# Patient Record
Sex: FEMALE | Race: WHITE | Employment: FULL TIME | URBAN - METROPOLITAN AREA
[De-identification: names, ages, dates, MRNs, and addresses within clinical notes are randomized per-mention and may not be internally consistent; named-entity substitution may affect disease eponyms.]

---

## 2017-05-15 ENCOUNTER — DOCUMENTATION ONLY (OUTPATIENT)
Dept: SURGERY | Age: 42
End: 2017-05-15

## 2017-05-15 NOTE — PROGRESS NOTES
Left message reminding of appointment/location.  Arrive 15 mins early to fill out paperwork/bring id/insurance card

## 2017-05-16 ENCOUNTER — DOCUMENTATION ONLY (OUTPATIENT)
Dept: SURGERY | Age: 42
End: 2017-05-16

## 2021-02-26 NOTE — PROGRESS NOTES
Neurology Note    Patient ID:  Zachariah Guadalupe  [id number]  59 y.o.  1975      Date of Consultation:  March 1, 2021    Referring Physician: Dr. Jacquie Green    Reason for Consultation:  Neck, arm and shoulder pain    Subjective: I have pain. History of Present Illness:   Zachariah Guadalupe is a 39 y.o. female who presents to the neurology clinic at [facility] for evaluation. The patient states that the current symptoms began approximately 1 year ago. She began to notice pain in her shoulder blade. It ramped up in intensity and she was getting nausea. She went to see physical therapy at [facility] ultimately in October 2020. They were concerned about cervical spine etiology. Her neck pain began to worsen. She felt that there was some weakness developing in her right upper extremity. She was also noticing some fatigue on that side. The pain and fatigue was less so on the left side. She did have an MRI of her cervical spine which showed multilevel degeneration but worse at the C5 and C6 levels. There is no cord root impingement. She was being followed closely by the physical medicine and rehabilitation department. She was offered injections as well as prescription medication such as Lyrica. She was hesitant on both of those and saw a neuromuscular consultation. She did have an EMG/nerve conduction study performed at [facility] and revealed no evidence of an active radiculopathy or focal neuropathy. This was performed on February 15, 2021. She does talk about multiple other symptoms that she will have intermittently. She notices that she would occasionally get neck weakness after intense neck exercises. Afterwards she would just want to rest.  This lasted approximately 2 hours the first time. Then the next day she had a shorter episode that may have lasted up to 30 minutes.   She states that she does have scapular pain and right pain that persist.  Over the past couple weeks however she has felt that the symptoms are better. She does have a prior neurological history. 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 She went revascularization with pipeline as well as carotid stenting on March 26, 2016 with restoration of flow. She did have a subsequent small branch artery retinal occlusion and was placed on aspirin and Plavix. 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 This did improve and she remained on aspirin 325. Due to development of worsening neck pain, she did have a follow-up CTA of the head neck. She is followed closely with interventional neurosurgery and did have an updated ultrasound which was unremarkable. 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 On vyvanse.     Past Medical History:   Diagnosis Date    Asthma     Cancer ([facility] [address]     thyroid papillary    Depression     HTN (hypertension)     Inflammatory arthritis       Shingles in the past.       Past Surgical History:   Procedure Laterality Date    HX OTHER SURGICAL  2016    carotid artery dissection    HX THYROIDECTOMY  1999    total    HX TONSIL AND ADENOIDECTOMY  1984        Family History   Problem Relation Age of Onset    Stroke Father     Diabetes Father     Hypertension Father     Hypertension Sister     Breast Cancer Sister     Heart Attack Sister         Social History     Tobacco Use    Smoking status: Never Smoker    Smokeless tobacco: Never Used   Substance Use Topics    Alcohol use: No        Allergies   Allergen Reactions    Albuterol Other (comments)    Bactrim [Sulfamethoprim Ds] [place] Other (comments)     Dust mites    Lamictal [Lamotrigine] Itching     Can be removed    Lisinopril Cough        Prior to Admission medications    Medication Sig Start Date End Date Taking? Authorizing Provider   calcium-magnesium-vit D3-boron 400 mg-133 mg- 6.67 mcg-1 mg cap g mL each dose, PO, bid, 0 Refills 2/17/21  Yes Provider, Historical   cholecalciferol (D3-2000) (2,000 UNITS /50 MCG) cap capsule Take  by mouth. Yes Provider, Historical   ciclopirox (LOPROX) 0.77 % topical cream Apply  to affected area two (2) times a day. Yes Provider, Historical   lisdexamfetamine (Vyvanse) 50 mg cap Take  by mouth daily. Yes Provider, Historical   lamoTRIgine (LaMICtaL) 150 mg tablet Take  by mouth daily. Yes Provider, Historical   sertraline (ZOLOFT) 100 mg tablet Take 100 mg by mouth two (2) times a day. Yes Provider, Historical   buPROPion XL (WELLBUTRIN XL) 300 mg XL tablet Take 300 mg by mouth every morning. Yes Provider, Historical   adalimumab (HUMRIA) 40 mg/0.8 mL injection Inject into the skin every 14 days. Yes Provider, Historical   levothyroxine (SYNTHROID) 175 mcg tablet Take 175 mcg by mouth daily. Take 30 minutes to 1 hour before breakfast   Yes Provider, Historical   fexofenadine (ALLEGRA) 180 mg tablet Take 60 mg by mouth daily. Yes Provider, Historical   cholecalciferol, vitamin d3, (VITAMIN D) 1,000 unit tablet Take 5,000 Units by mouth as directed. 3 times a week  10/27/10  Yes Provider, Historical   ferrous fum/docusate/folic ac (FERROUS FUM-FOLIC ACID-DSS PO) Take  by mouth. Provider, Historical   levalbuterol (XOPENEX) 0.31 mg/3 mL nebu Take  by inhalation. Provider, Historical   LATUDA 40 mg tab tablet  4/14/16   Provider, Historical   aspirin delayed-release 325 mg tablet  3/26/16   Provider, Historical   clopidogrel (PLAVIX) 75 mg tablet  4/22/16   Provider, Historical   methotrexate, PF, 15 mg/0.3 mL atIn Inject into the skin. Provider, Historical   warfarin (COUMADIN) 7.5 mg tablet Take 7.5 mg by mouth daily.  Indications: 4 days a week    Provider, Historical   warfarin (COUMADIN) 10 mg tablet Take 10 mg by mouth daily. Indications: 3 days a week    Provider, Historical   pantoprazole (PROTONIX) 20 mg tablet Take 20 mg by mouth daily. Provider, Historical   ferrous gluconate 325 mg (37 mg iron) tab Take 1 Tab by mouth daily. Provider, Historical   levalbuterol (XOPENEX) 0.63 mg/3 mL nebulizer solution 0.63 mg by Nebulization route once. Provider, Historical   FLUoxetine (PROZAC) 10 mg capsule Take 40 mg by mouth daily. Provider, Historical   irbesartan (AVAPRO) 150 mg tablet Take 150 mg by mouth nightly. Provider, Historical   leucovorin calcium (WELLCOVORIN) 5 mg tablet Take  by mouth. Indications: 3 days per week    Provider, Historical       Review of Systems:    General, constitutional: tight neck muscles. Eyes, vision: intermittent blurry in her vision, rarely. In the left eye. Ears, nose, throat: negative  Cardiovascular, heart: negative  Respiratory: negative  Gastrointestinal: negative  Genitourinary: negative  Musculoskeletal: joint pain. Right toe numbness. Skin and integumentary: negative  Psychiatric: negative  Endocrine: negative  Neurological: negative, except for HPI  Hematologic/lymphatic: negative  Allergy/immunology: negative    Objective:     Visit Vitals  /64 (BP 1 Location: Right arm, BP Patient Position: Sitting, BP Cuff Size: Adult)   Pulse (!) 108   Resp 17   Wt 192 lb (87.1 kg)   SpO2 96%   BMI 30.07 kg/m²       Physical Exam:      General:  appears well nourished in no acute distress  Neck: no carotid bruits  Lungs: clear to auscultation  Heart:  no murmurs, regular rate  Lower extremity: peripheral pulses palpable and no edema  Skin: intact    Neurological exam:    Awake, alert, oriented to person, place and time  Recent and remote memory were normal  Attention and concentration were intact  Language was intact.   There was no aphasia  Speech: no dysarthria  Fund of knowledge was preserved    Cranial nerves:   II-XII were tested    Mild aniscoria  Fundoscopic examination revealed venous pulsations and no clear abnormalities  Visual fields were full  Eomi, no evidence of nystagmus  Facial sensation:  normal and symmetric  Facial motor: normal and symmetric  Hearing intact  SCM strength intact  Tongue: midline without fasciculations    Motor: Tone normal  Pronator drift was absent. No evidence of fasciculations    Strength testing:   deltoid triceps biceps Wrist ext. Wrist flex. intrinsics Hip flex. Hip ext. Knee ext. Knee flex Dorsi flex Plantar flex   Right 5 5 5 5 5 5 5 5 5 5 5 5   Left 5 5 5 5 5 5 5 5 5 5 5 5     No wingled scapula. Rhomboid, infra, serratus strength was all 5/5    Sensory:  Upper extremity: intact to pp, light touch, and vibration > 10 seconds  Lower extremity: intact to pp, light touch, and vibration > 10 seconds    Reflexes:    Right Left  Biceps  2 2  Triceps 2 2  Brachiorad. 2 2  Patella  2 2  Achilles 2 2    Plantar response:  flexor bilaterally      Cerebellar testing:  no tremor apparent, finger/nose and kamryn were intact    Romberg: absent    Gait: steady. She was able to tandem gait without difficulty    Labs:     No results found for: HBA1C, NA, K, CL, GLU, BUN, CREA, CA, WBC, HCT, HGB, PLT, LDL, [id number], HCTEXT, HGBEXT, PLTEXT, [id number], HCTEXT, HGBEXT, PLTEXT    Imaging:    No results found for this or any previous visit. No results found for this or any previous visit. Medical records from [facility] as noted above. Assessment and Plan:    The patient is a pleasant 53-year-old female who presents with intermittent episodes of neck pain and scapular pain. She does have a history of degenerative spine disease in her cervical spine as well as inflammatory arthritis. Her neurological and neuromuscular examination revealed no evidence of focal weakness, sensory deficit or abnormal reflexes.     Neck pain and musculoskeletal scapular pain:  This is most likely multifactorial in etiology including her history of cervical degenerative spine disease, inflammatory arthritis, and musculoskeletal related tendon inflammation. I did reassure her that with a normal neuromuscular examination as well as a normal EMG/nerve conduction study, that I do not think any additional testing is necessary for her current symptoms. I did talk to her about using over-the-counter anti-inflammatory ointment, Voltaren, to the affected areas. I did stress to her the importance of stretching and exercise. Consider deep tissue massage. I did recommend given her history of a carotid dissection as well cervical spine disease that she should not consider chiropractic evaluation. this is something she asked about. I did tell the patient that if symptoms worsen or she notices weakness that is persistent, that she should call the office and I will get her in for a urgent follow-up visit. Carotid artery dissection:  Continue close follow up with interventional neurosurgery    Narcolepsy:   Continue current treatment    Depression:  Continue current treatment           Patient Active Problem List   Diagnosis Code    Heart palpitations R00.2    Essential hypertension, benign I10    Major depressive disorder F32.9    Anxiety F41.9    Allergic rhinitis J30.9    Sleep apnea G47.30    Disorder of carotid artery (HCC) I77.9    Retinal artery occlusion H34.9        The patient should return to clinic as needed    Renewed medication: none     I spent  70  minutes on the day of the encounter preparing the office visit by reviewing medical records, obtaining a history, performing examination, counseling and educating the patient and family members on diagnosis, ordering medications and tests, documenting in the clinical medical record, and coordinating the care for the patient. The patient had the ability to ask questions and all questions were answered.                Signed By:  Jennifer Schumacher DO FAAN    March 1, 2021

## 2021-03-01 ENCOUNTER — OFFICE VISIT (OUTPATIENT)
Dept: NEUROLOGY | Age: 46
End: 2021-03-01
Payer: COMMERCIAL

## 2021-03-01 VITALS
HEART RATE: 108 BPM | DIASTOLIC BLOOD PRESSURE: 64 MMHG | OXYGEN SATURATION: 96 % | SYSTOLIC BLOOD PRESSURE: 110 MMHG | BODY MASS INDEX: 30.07 KG/M2 | RESPIRATION RATE: 17 BRPM | WEIGHT: 192 LBS

## 2021-03-01 DIAGNOSIS — M62.81 MUSCLE LEFT ARM WEAKNESS: ICD-10-CM

## 2021-03-01 DIAGNOSIS — M54.2 NECK PAIN: Primary | ICD-10-CM

## 2021-03-01 PROBLEM — I77.9 DISORDER OF CAROTID ARTERY (HCC): Status: ACTIVE | Noted: 2021-03-01

## 2021-03-01 PROBLEM — G47.30 SLEEP APNEA: Status: ACTIVE | Noted: 2021-03-01

## 2021-03-01 PROBLEM — J30.9 ALLERGIC RHINITIS: Status: ACTIVE | Noted: 2021-03-01

## 2021-03-01 PROBLEM — F32.9 MAJOR DEPRESSIVE DISORDER: Status: ACTIVE | Noted: 2021-03-01

## 2021-03-01 PROBLEM — F41.9 ANXIETY: Status: ACTIVE | Noted: 2021-03-01

## 2021-03-01 PROCEDURE — 99205 OFFICE O/P NEW HI 60 MIN: CPT | Performed by: PSYCHIATRY & NEUROLOGY

## 2021-03-01 RX ORDER — BUPROPION HYDROCHLORIDE 300 MG/1
300 TABLET ORAL
COMMUNITY

## 2021-03-01 RX ORDER — SERTRALINE HYDROCHLORIDE 100 MG/1
200 TABLET, FILM COATED ORAL
COMMUNITY

## 2021-03-01 RX ORDER — LAMOTRIGINE 100 MG/1
200 TABLET ORAL
COMMUNITY

## 2021-03-01 RX ORDER — LEVALBUTEROL INHALATION SOLUTION 0.31 MG/3ML
SOLUTION RESPIRATORY (INHALATION)
COMMUNITY
End: 2022-08-28

## 2021-03-01 RX ORDER — CALCIUM/MAGNESIUM
TABLET ORAL
COMMUNITY
Start: 2021-02-17 | End: 2022-08-28

## 2021-03-01 RX ORDER — ACETAMINOPHEN 500 MG
TABLET ORAL
COMMUNITY
End: 2022-08-28

## 2021-03-01 RX ORDER — CICLOPIROX OLAMINE 7.7 MG/G
CREAM TOPICAL 2 TIMES DAILY
COMMUNITY
End: 2022-08-28

## 2022-03-18 PROBLEM — J30.9 ALLERGIC RHINITIS: Status: ACTIVE | Noted: 2021-03-01

## 2022-03-18 PROBLEM — G47.30 SLEEP APNEA: Status: ACTIVE | Noted: 2021-03-01

## 2022-03-19 PROBLEM — I77.9 DISORDER OF CAROTID ARTERY (HCC): Status: ACTIVE | Noted: 2021-03-01

## 2022-03-19 PROBLEM — F41.9 ANXIETY: Status: ACTIVE | Noted: 2021-03-01

## 2022-03-19 PROBLEM — F32.9 MAJOR DEPRESSIVE DISORDER: Status: ACTIVE | Noted: 2021-03-01

## 2022-08-26 ENCOUNTER — TELEPHONE (OUTPATIENT)
Dept: NEUROLOGY | Age: 47
End: 2022-08-26

## 2022-08-26 NOTE — TELEPHONE ENCOUNTER
Patient called wanting to discuss with Dr or Nurse her needing to be seen for transient neurological episodes. Please advise.    [phone]

## 2022-08-28 ENCOUNTER — APPOINTMENT (OUTPATIENT)
Dept: MRI IMAGING | Age: 47
DRG: 072 | End: 2022-08-28
Attending: HOSPITALIST
Payer: COMMERCIAL

## 2022-08-28 ENCOUNTER — HOSPITAL ENCOUNTER (INPATIENT)
Age: 47
LOS: 5 days | Discharge: HOME | DRG: 072 | End: 2022-09-02
Attending: EMERGENCY MEDICINE | Admitting: INTERNAL MEDICINE
Payer: COMMERCIAL

## 2022-08-28 DIAGNOSIS — R51.9 FREQUENT HEADACHES: ICD-10-CM

## 2022-08-28 DIAGNOSIS — R40.4 TRANSIENT ALTERATION OF AWARENESS: ICD-10-CM

## 2022-08-28 DIAGNOSIS — R41.0 CONFUSION: Primary | ICD-10-CM

## 2022-08-28 DIAGNOSIS — F33.9 EPISODE OF RECURRENT MAJOR DEPRESSIVE DISORDER, UNSPECIFIED DEPRESSION EPISODE SEVERITY (HCC): ICD-10-CM

## 2022-08-28 DIAGNOSIS — R47.9 DIFFICULTY WITH SPEECH: ICD-10-CM

## 2022-08-28 LAB
ALBUMIN SERPL-MCNC: 3.7 G/DL (ref 3.5–5)
ALBUMIN/GLOB SERPL: 1.1 {RATIO} (ref 1.1–2.2)
ALP SERPL-CCNC: 73 U/L (ref 45–117)
ALT SERPL-CCNC: 29 U/L (ref 12–78)
ANION GAP SERPL CALC-SCNC: 1 MMOL/L (ref 5–15)
APPEARANCE UR: CLEAR
AST SERPL-CCNC: 18 U/L (ref 15–37)
BASOPHILS # BLD: 0.1 K/UL (ref 0–0.1)
BASOPHILS NFR BLD: 1 % (ref 0–1)
BILIRUB SERPL-MCNC: 0.3 MG/DL (ref 0.2–1)
BILIRUB UR QL: NEGATIVE
BUN SERPL-MCNC: 11 MG/DL (ref 6–20)
BUN/CREAT SERPL: 16 (ref 12–20)
CALCIUM SERPL-MCNC: 9.1 MG/DL (ref 8.5–10.1)
CHLORIDE SERPL-SCNC: 106 MMOL/L (ref 97–108)
CO2 SERPL-SCNC: 30 MMOL/L (ref 21–32)
COLOR UR: NORMAL
COMMENT, HOLDF: NORMAL
CREAT SERPL-MCNC: 0.7 MG/DL (ref 0.55–1.02)
DIFFERENTIAL METHOD BLD: ABNORMAL
EOSINOPHIL # BLD: 0.1 K/UL (ref 0–0.4)
EOSINOPHIL NFR BLD: 1 % (ref 0–7)
ERYTHROCYTE [DISTWIDTH] IN BLOOD BY AUTOMATED COUNT: 11.9 % (ref 11.5–14.5)
ERYTHROCYTE [SEDIMENTATION RATE] IN BLOOD: 6 MM/HR (ref 0–20)
GLOBULIN SER CALC-MCNC: 3.4 G/DL (ref 2–4)
GLUCOSE SERPL-MCNC: 89 MG/DL (ref 65–100)
GLUCOSE UR STRIP.AUTO-MCNC: NEGATIVE MG/DL
HCT VFR BLD AUTO: 41.5 % (ref 35–47)
HGB BLD-MCNC: 14.5 G/DL (ref 11.5–16)
HGB UR QL STRIP: NEGATIVE
IMM GRANULOCYTES # BLD AUTO: 0.1 K/UL (ref 0–0.04)
IMM GRANULOCYTES NFR BLD AUTO: 1 % (ref 0–0.5)
KETONES UR QL STRIP.AUTO: NEGATIVE MG/DL
LEUKOCYTE ESTERASE UR QL STRIP.AUTO: NEGATIVE
LYMPHOCYTES # BLD: 1.3 K/UL (ref 0.8–3.5)
LYMPHOCYTES NFR BLD: 11 % (ref 12–49)
MCH RBC QN AUTO: 34.1 PG (ref 26–34)
MCHC RBC AUTO-ENTMCNC: 34.9 G/DL (ref 30–36.5)
MCV RBC AUTO: 97.6 FL (ref 80–99)
MONOCYTES # BLD: 0.9 K/UL (ref 0–1)
MONOCYTES NFR BLD: 8 % (ref 5–13)
NEUTS SEG # BLD: 9.3 K/UL (ref 1.8–8)
NEUTS SEG NFR BLD: 78 % (ref 32–75)
NITRITE UR QL STRIP.AUTO: NEGATIVE
NRBC # BLD: 0 K/UL (ref 0–0.01)
NRBC BLD-RTO: 0 PER 100 WBC
PH UR STRIP: 6.5 [PH] (ref 5–8)
PLATELET # BLD AUTO: 224 K/UL (ref 150–400)
PMV BLD AUTO: 9.6 FL (ref 8.9–12.9)
POTASSIUM SERPL-SCNC: 4.1 MMOL/L (ref 3.5–5.1)
PROT SERPL-MCNC: 7.1 G/DL (ref 6.4–8.2)
PROT UR STRIP-MCNC: NEGATIVE MG/DL
RBC # BLD AUTO: 4.25 M/UL (ref 3.8–5.2)
SAMPLES BEING HELD,HOLD: NORMAL
SODIUM SERPL-SCNC: 137 MMOL/L (ref 136–145)
SP GR UR REFRACTOMETRY: 1.01 (ref 1–1.03)
UR CULT HOLD, URHOLD: NORMAL
UROBILINOGEN UR QL STRIP.AUTO: 0.2 EU/DL (ref 0.2–1)
WBC # BLD AUTO: 11.7 K/UL (ref 3.6–11)

## 2022-08-28 PROCEDURE — 74011250636 HC RX REV CODE- 250/636: Performed by: HOSPITALIST

## 2022-08-28 PROCEDURE — G0378 HOSPITAL OBSERVATION PER HR: HCPCS

## 2022-08-28 PROCEDURE — 85652 RBC SED RATE AUTOMATED: CPT

## 2022-08-28 PROCEDURE — 70544 MR ANGIOGRAPHY HEAD W/O DYE: CPT

## 2022-08-28 PROCEDURE — 85025 COMPLETE CBC W/AUTO DIFF WBC: CPT

## 2022-08-28 PROCEDURE — 81003 URINALYSIS AUTO W/O SCOPE: CPT

## 2022-08-28 PROCEDURE — 80053 COMPREHEN METABOLIC PANEL: CPT

## 2022-08-28 PROCEDURE — 99285 EMERGENCY DEPT VISIT HI MDM: CPT

## 2022-08-28 PROCEDURE — 95816 EEG AWAKE AND DROWSY: CPT | Performed by: HOSPITALIST

## 2022-08-28 PROCEDURE — 36415 COLL VENOUS BLD VENIPUNCTURE: CPT

## 2022-08-28 PROCEDURE — 70551 MRI BRAIN STEM W/O DYE: CPT

## 2022-08-28 PROCEDURE — 65270000029 HC RM PRIVATE

## 2022-08-28 PROCEDURE — 74011250637 HC RX REV CODE- 250/637: Performed by: HOSPITALIST

## 2022-08-28 PROCEDURE — 70547 MR ANGIOGRAPHY NECK W/O DYE: CPT

## 2022-08-28 RX ORDER — LAMOTRIGINE 100 MG/1
200 TABLET ORAL
Status: DISCONTINUED | OUTPATIENT
Start: 2022-08-28 | End: 2022-09-03 | Stop reason: HOSPADM

## 2022-08-28 RX ORDER — LEFLUNOMIDE 20 MG/1
20 TABLET ORAL DAILY
COMMUNITY

## 2022-08-28 RX ORDER — LAMOTRIGINE 25 MG/1
25 TABLET ORAL DAILY
Status: DISCONTINUED | OUTPATIENT
Start: 2022-08-29 | End: 2022-09-03 | Stop reason: HOSPADM

## 2022-08-28 RX ORDER — MONTELUKAST SODIUM 10 MG/1
10 TABLET ORAL
Status: DISCONTINUED | OUTPATIENT
Start: 2022-08-28 | End: 2022-09-03 | Stop reason: HOSPADM

## 2022-08-28 RX ORDER — LEVOTHYROXINE SODIUM 175 UG/1
350 TABLET ORAL
COMMUNITY

## 2022-08-28 RX ORDER — PROMETHAZINE HYDROCHLORIDE 12.5 MG/1
12.5 TABLET ORAL
COMMUNITY

## 2022-08-28 RX ORDER — CHOLECALCIFEROL TAB 125 MCG (5000 UNIT) 125 MCG
5000 TAB ORAL
COMMUNITY

## 2022-08-28 RX ORDER — PRAVASTATIN SODIUM 10 MG/1
10 TABLET ORAL
COMMUNITY

## 2022-08-28 RX ORDER — LEVALBUTEROL TARTRATE 45 UG/1
2 AEROSOL, METERED ORAL
COMMUNITY

## 2022-08-28 RX ORDER — MONTELUKAST SODIUM 10 MG/1
10 TABLET ORAL
COMMUNITY

## 2022-08-28 RX ORDER — ACETAMINOPHEN 650 MG/1
650 SUPPOSITORY RECTAL
Status: DISCONTINUED | OUTPATIENT
Start: 2022-08-28 | End: 2022-09-03 | Stop reason: HOSPADM

## 2022-08-28 RX ORDER — LEVOTHYROXINE SODIUM 175 UG/1
262.5 TABLET ORAL
COMMUNITY

## 2022-08-28 RX ORDER — BUPROPION HYDROCHLORIDE 100 MG/1
200 TABLET, EXTENDED RELEASE ORAL
Status: DISCONTINUED | OUTPATIENT
Start: 2022-08-28 | End: 2022-08-28

## 2022-08-28 RX ORDER — (CALCIUM, MAGNESIUM, POTASSIUM, AND SODIUM OXYBATES) .5; .5; .5; .5 G/ML; G/ML; G/ML; G/ML
2750 SOLUTION ORAL
COMMUNITY

## 2022-08-28 RX ORDER — PREDNISONE 5 MG/1
10 TABLET ORAL DAILY
COMMUNITY

## 2022-08-28 RX ORDER — PRAVASTATIN SODIUM 10 MG/1
10 TABLET ORAL
Status: DISCONTINUED | OUTPATIENT
Start: 2022-08-28 | End: 2022-09-03 | Stop reason: HOSPADM

## 2022-08-28 RX ORDER — LAMOTRIGINE 25 MG/1
25 TABLET ORAL DAILY
COMMUNITY

## 2022-08-28 RX ORDER — ATENOLOL 25 MG/1
25 TABLET ORAL EVERY 12 HOURS
Status: DISCONTINUED | OUTPATIENT
Start: 2022-08-28 | End: 2022-09-03 | Stop reason: HOSPADM

## 2022-08-28 RX ORDER — ENOXAPARIN SODIUM 100 MG/ML
40 INJECTION SUBCUTANEOUS EVERY 24 HOURS
Status: DISCONTINUED | OUTPATIENT
Start: 2022-08-28 | End: 2022-08-30

## 2022-08-28 RX ORDER — ACETAMINOPHEN 500 MG
1000 TABLET ORAL
COMMUNITY

## 2022-08-28 RX ORDER — PREDNISONE 10 MG/1
10 TABLET ORAL DAILY
Status: DISCONTINUED | OUTPATIENT
Start: 2022-08-29 | End: 2022-09-03 | Stop reason: HOSPADM

## 2022-08-28 RX ORDER — BACLOFEN 10 MG/1
10 TABLET ORAL EVERY 12 HOURS
COMMUNITY

## 2022-08-28 RX ORDER — SERTRALINE HYDROCHLORIDE 50 MG/1
200 TABLET, FILM COATED ORAL
Status: DISCONTINUED | OUTPATIENT
Start: 2022-08-28 | End: 2022-09-03 | Stop reason: HOSPADM

## 2022-08-28 RX ORDER — MINERAL OIL
ENEMA (ML) RECTAL DAILY
Status: DISCONTINUED | OUTPATIENT
Start: 2022-08-30 | End: 2022-09-03 | Stop reason: HOSPADM

## 2022-08-28 RX ORDER — LEVOTHYROXINE SODIUM 175 UG/1
175 TABLET ORAL
COMMUNITY

## 2022-08-28 RX ORDER — ACETAMINOPHEN 325 MG/1
650 TABLET ORAL
Status: DISCONTINUED | OUTPATIENT
Start: 2022-08-28 | End: 2022-09-03 | Stop reason: HOSPADM

## 2022-08-28 RX ORDER — LEFLUNOMIDE 10 MG/1
20 TABLET ORAL DAILY
Status: DISCONTINUED | OUTPATIENT
Start: 2022-08-29 | End: 2022-09-03 | Stop reason: HOSPADM

## 2022-08-28 RX ORDER — GUAIFENESIN 100 MG/5ML
81 LIQUID (ML) ORAL DAILY
Status: DISCONTINUED | OUTPATIENT
Start: 2022-08-29 | End: 2022-09-03 | Stop reason: HOSPADM

## 2022-08-28 RX ORDER — ATENOLOL 25 MG/1
25 TABLET ORAL EVERY 12 HOURS
COMMUNITY

## 2022-08-28 RX ORDER — SEMAGLUTIDE 1.34 MG/ML
0.25 INJECTION, SOLUTION SUBCUTANEOUS EVERY OTHER DAY
COMMUNITY

## 2022-08-28 RX ORDER — THERA TABS 400 MCG
1 TAB ORAL DAILY
COMMUNITY

## 2022-08-28 RX ADMIN — LAMOTRIGINE 200 MG: 100 TABLET ORAL at 21:11

## 2022-08-28 RX ADMIN — ENOXAPARIN SODIUM 40 MG: 100 INJECTION SUBCUTANEOUS at 21:15

## 2022-08-28 RX ADMIN — SERTRALINE 200 MG: 50 TABLET, FILM COATED ORAL at 21:14

## 2022-08-28 RX ADMIN — PRAVASTATIN SODIUM 10 MG: 10 TABLET ORAL at 21:11

## 2022-08-28 RX ADMIN — MONTELUKAST 10 MG: 10 TABLET, FILM COATED ORAL at 21:11

## 2022-08-28 RX ADMIN — ATENOLOL 25 MG: 25 TABLET ORAL at 21:12

## 2022-08-28 NOTE — PROGRESS NOTES
Pharmacist Admission Medication Reconciliation:    Information obtained from: This medication history was obtained from the patient. She brought her medication bottles to the hospital and was able to answer clarification questions. RxQuery data available¹:  YES    Allergies:  Albuterol, Bactrim [sulfamethoprim ds], Lisinopril, Olmesartan, and Spironolactone    Significant PMH/Disease States:   Past Medical History:   Diagnosis Date    Asthma     Cancer ([facility] [address]     thyroid papillary    Depression     HTN (hypertension)     Inflammatory arthritis      Chief Complaint for this Admission:    Chief Complaint   Patient presents with    Altered mental status     Prior to Admission Medications:   Prior to Admission Medications   Prescriptions Last Dose Informant Patient Reported? Taking? CAFFEINE PO   Yes Yes   Sig: Take 1,000 mg by mouth four (4) times daily as needed. ([employer])   Lisdexamfetamine (VYVANSE) 70 mg cap 2022  Yes Yes   Sig: Take 70 mg by mouth daily. acetaminophen (TYLENOL) 500 mg tablet   Yes Yes   Sig: Take 1,000 mg by mouth four (4) times daily as needed for Pain (headache). adalimumab (HUMRIA) 40 mg/0.8 mL injection   Yes Yes   Si mg by SubCUTAneous route every Tuesday. aspirin delayed-release 81 mg tablet 2022  Yes Yes   Sig: Take 81 mg by mouth daily. Indications: carotid artery disection with stent   atenoloL (TENORMIN) 25 mg tablet 2022 at am  Yes Yes   Sig: Take 25 mg by mouth every twelve (12) hours. baclofen (LIORESAL) 10 mg tablet 2022 at am  Yes Yes   Sig: Take 10 mg by mouth every twelve (12) hours. buPROPion XL (WELLBUTRIN XL) 300 mg XL tablet 2022  Yes Yes   Sig: Take 300 mg by mouth nightly. cholecalciferol (VITAMIN D3) (5000 Units/125 mcg) tab tablet   Yes Yes   Sig: Take 5,000 Units by mouth five (5) days a week. fexofenadine (ALLEGRA) 180 mg tablet 2022 at am  Yes Yes   Sig: Take 180 mg by mouth every twelve (12) hours.    lamoTRIgine (LaMICtal) 100 mg tablet 2022  Yes Yes   Sig: Take 200 mg by mouth nightly. (25 mg in the morning; 200 mg in the evening)   lamoTRIgine (LaMICtal) 25 mg tablet 2022  Yes Yes   Sig: Take 25 mg by mouth daily. (25 mg in the morning; 200 mg in the evening)   leflunomide (ARAVA) 20 mg tablet 2022  Yes Yes   Sig: Take 20 mg by mouth daily. levalbuterol tartrate (XOPENEX) 45 mcg/actuation inhaler   Yes Yes   Sig: Take 2 Puffs by inhalation every four (4) hours as needed for Wheezing or Shortness of Breath. levothyroxine (SYNTHROID) 175 mcg tablet 2022  Yes Yes   Sig: Take 175 mcg by mouth five (5) days a week. (175 mcg x 5 days; 350 mcg on ; 262.5 mcg on Wednesday)   levothyroxine (SYNTHROID) 175 mcg tablet 2022  Yes Yes   Sig: Take 350 mcg by mouth every . (175 mcg x 5 days; 350 mcg on ; 262.5 mcg on Wednesday)   levothyroxine (SYNTHROID) 175 mcg tablet 2022  Yes Yes   Sig: Take 262.5 mcg by mouth every Wednesday. (175 mcg x 5 days; 350 mcg on ; 262.5 mcg on Wednesday)   montelukast (SINGULAIR) 10 mg tablet 2022  Yes Yes   Sig: Take 10 mg by mouth nightly. pravastatin (PRAVACHOL) 10 mg tablet 2022  Yes Yes   Sig: Take 10 mg by mouth nightly. predniSONE (DELTASONE) 5 mg tablet   Yes Yes   Sig: Take 10 mg by mouth daily. promethazine (PHENERGAN) 12.5 mg tablet   Yes Yes   Sig: Take 12.5 mg by mouth every six (6) hours as needed for Nausea. semaglutide (Ozempic) 1 mg/dose (2 mg/1.5 mL) sub-q pen   Yes Yes   Si.25 mg by SubCUTAneous route every other day. sertraline (ZOLOFT) 100 mg tablet 2022  Yes Yes   Sig: Take 200 mg by mouth nightly.   sodium,calcium,mag,pot oxybate (Xywav) 0.5 gram/mL soln   Yes Yes   Sig: Take 2,750 mg by mouth nightly. (dose = 2750 mg = 5.5 mL)   therapeutic multivitamin (Thera) tablet   Yes Yes   Sig: Take 1 Tablet by mouth daily.       Facility-Administered Medications: None     Thank you for allowing me to participate in this patient's care. Please call  or  with any questions. Tamera Puckett., BCPS, BCPPS

## 2022-08-28 NOTE — ED TRIAGE NOTES
Pt arrives for intermittent confusion, periods of inability to perform known tasks and not recognizing known places. These symptoms have been persistent x 2 weeks, she was seen at [facility] ED on Friday, had neuro workup and eval to include CTA head and neck and neurologist ehsan and jair'd with plan to follow up this week with neuro but now has concern for seizure activity due to presenting symptoms. Pt appears very anxious and has scattered thought processes with difficulty explaining symptoms. A&OX4, GCS 15. Denies any recent trauma or injuries.

## 2022-08-28 NOTE — ED PROVIDER NOTES
Date of Service:  8/28/2022    Patient:  Adarsh Landrum    Chief Complaint:  Altered mental status       HPI:  Adarsh Landrum is a 42 y.o.  female who presents for evaluation of confusion, forgetfulness, trouble with her speech clumsiness multiple other complaints. Patient states that since Friday she has been confused. She notes headaches that they believe her complex migraines that could be a cause of most of her symptoms. Extensive history included dissection was on blood thinners with work-up with providers recently she had been seen at [facility] ED at the request of one of her providers and had a unremarkable CTA head and neck, unremarkable work-up. She states that she was seen there by neurology resident but never was seen by the neurology attending. 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 She notes auditory illusions, not hallucinations at home. Generalized clumsiness. No change in her vision or hearing. She is able to ambulate okay. She denies numbness tingling trauma or other acute complaints. Patient is very worried and is concerned that she has not seen neurology yet although she has been referred to neuropsychology. She also was told that she needs a EEG and is requesting 1 here but she is believes that she is having temporal seizures that could be causing all this.        Past Medical History:   Diagnosis Date    Asthma     Cancer ([facility] [address]     thyroid papillary    Depression     HTN (hypertension)     Inflammatory arthritis        Past Surgical History:   Procedure Laterality Date    HX OTHER SURGICAL  2016    carotid artery dissection    HX THYROIDECTOMY  1999    total    HX TONSIL AND ADENOIDECTOMY  1984         Family History: Problem Relation Age of Onset    Stroke Father     Diabetes Father     Hypertension Father     Hypertension Sister     Breast Cancer Sister     Heart Attack Sister        Social History     Socioeconomic History    Marital status:      Spouse name: Not on file    Number of children: Not on file    Years of education: Not on file    Highest education level: Not on file   Occupational History    Not on file   Tobacco Use    Smoking status: Never    Smokeless tobacco: Never   Substance and Sexual Activity    Alcohol use: No    Drug use: Not on file    Sexual activity: Not on file   Other Topics Concern    Not on file   Social History Narrative    Not on file     Social Determinants of Health     Financial Resource Strain: Not on file   Food Insecurity: Not on file   Transportation Needs: Not on file   Physical Activity: Not on file   Stress: Not on file   Social Connections: Not on file   Intimate Partner Violence: Not on file   Housing Stability: Not on file         ALLERGIES: Albuterol, Bactrim [sulfamethoprim ds], House dust, Lamictal [lamotrigine], and Lisinopril    Review of Systems   All other systems reviewed and are negative. Vitals:    08/28/22 1514 08/28/22 1636   BP: (!) 161/93 (!) 146/83   Pulse: 84 77   Resp: 18 14   Temp: 98 °F (36.7 °C) 98.3 °F (36.8 °C)   SpO2: 98% 96%   Weight:  85.7 kg (189 lb)   Height:  5' 7\" (1.702 m)            Physical Exam  Vitals and nursing note reviewed. Constitutional:       Appearance: She is well-developed. HENT:      Head: Normocephalic and atraumatic. Nose: Nose normal.      Mouth/Throat:      Mouth: Mucous membranes are moist.   Eyes:      General: No scleral icterus. Cardiovascular:      Rate and Rhythm: Normal rate. Pulmonary:      Effort: Pulmonary effort is normal.   Abdominal:      General: Abdomen is flat. Musculoskeletal:         General: No deformity. Skin:     General: Skin is warm.       Capillary Refill: Capillary refill takes less than 2 seconds. Neurological:      Mental Status: She is alert and oriented to person, place, and time. Gait: Gait normal.   Psychiatric:         Mood and Affect: Mood normal.        MDM     VITAL SIGNS:  Patient Vitals for the past 4 hrs:   Temp Pulse Resp BP SpO2   08/28/22 1710 -- -- -- -- 97 %   08/28/22 1636 98.3 °F (36.8 °C) 77 14 (!) 146/83 96 %   08/28/22 1514 98 °F (36.7 °C) 84 18 (!) 161/93 98 %         LABS:  No results found for this or any previous visit (from the past 6 hour(s)). IMAGING:  No orders to display         Medications During Visit:  Medications - No data to display      DECISION MAKING:  Chary Salgado is a 42 y.o. female who comes in as above. Here, patient has no obvious focal neurodeficit. She has a description of headaches with some focal shaking confusion forgetfulness and myriad of other complaints. Patient has been seen by teleneurology who agrees that this could be focal seizures. Given the continuing symptoms, will bring the patient in for MRI/MRA and neurological evaluation with probable EEG. Please refer to their note for further      IMPRESSION:  1. Confusion    2. Frequent headaches    3. Difficulty with speech        DISPOSITION:  Admitted        Procedures          Perfect Serve Consult for Admission  5:56 PM    ED Room Number: ER09/09  Patient Name and age:  Chary Salgado 42 y.o.  female  Working Diagnosis:   1. Confusion    2. Frequent headaches    3. Difficulty with speech        COVID-19 Suspicion:  no  Sepsis present:  no  Reassessment needed: no  Code Status:  Full Code  Readmission: no  Isolation Requirements:  no  Recommended Level of Care:  med/surg  Department:[facility] Adult ED - 21   Other: Patient with multiple complaints including but is probably complex headaches, trouble with word finding, some focal shaking episodes, confusion, auditory illusions here and there.   Patient's been seen by [facility] with negative MRIs in the past, recent negative CTA head neck. Continues to have symptoms. Has been seen by teleneurology who recommends admission, inpatient neuro evaluation for possible focal seizures.   Teleneurology to leave note, recommend inpatient MRI/MRA brain

## 2022-08-29 ENCOUNTER — APPOINTMENT (OUTPATIENT)
Dept: MRI IMAGING | Age: 47
DRG: 072 | End: 2022-08-29
Attending: PSYCHIATRY & NEUROLOGY
Payer: COMMERCIAL

## 2022-08-29 PROBLEM — R41.82 AMS (ALTERED MENTAL STATUS): Status: ACTIVE | Noted: 2022-08-28

## 2022-08-29 LAB
COMMENT, HOLDF: NORMAL
SAMPLES BEING HELD,HOLD: NORMAL

## 2022-08-29 PROCEDURE — 95717 EEG PHYS/QHP 2-12 HR W/O VID: CPT | Performed by: PSYCHIATRY & NEUROLOGY

## 2022-08-29 PROCEDURE — 74011250636 HC RX REV CODE- 250/636

## 2022-08-29 PROCEDURE — 97530 THERAPEUTIC ACTIVITIES: CPT

## 2022-08-29 PROCEDURE — 95706 EEG WO VID 2-12HR INTMT MNTR: CPT | Performed by: NURSE PRACTITIONER

## 2022-08-29 PROCEDURE — 97165 OT EVAL LOW COMPLEX 30 MIN: CPT

## 2022-08-29 PROCEDURE — 99223 1ST HOSP IP/OBS HIGH 75: CPT | Performed by: PSYCHIATRY & NEUROLOGY

## 2022-08-29 PROCEDURE — 74011636637 HC RX REV CODE- 636/637: Performed by: HOSPITALIST

## 2022-08-29 PROCEDURE — 65270000029 HC RM PRIVATE

## 2022-08-29 PROCEDURE — 92522 EVALUATE SPEECH PRODUCTION: CPT | Performed by: SPEECH-LANGUAGE PATHOLOGIST

## 2022-08-29 PROCEDURE — A9576 INJ PROHANCE MULTIPACK: HCPCS

## 2022-08-29 PROCEDURE — 36415 COLL VENOUS BLD VENIPUNCTURE: CPT

## 2022-08-29 PROCEDURE — 95816 EEG AWAKE AND DROWSY: CPT | Performed by: PSYCHIATRY & NEUROLOGY

## 2022-08-29 PROCEDURE — 74011000250 HC RX REV CODE- 250: Performed by: INTERNAL MEDICINE

## 2022-08-29 PROCEDURE — G0378 HOSPITAL OBSERVATION PER HR: HCPCS

## 2022-08-29 PROCEDURE — 70552 MRI BRAIN STEM W/DYE: CPT

## 2022-08-29 PROCEDURE — 74011250636 HC RX REV CODE- 250/636: Performed by: HOSPITALIST

## 2022-08-29 PROCEDURE — 74011250637 HC RX REV CODE- 250/637: Performed by: HOSPITALIST

## 2022-08-29 RX ORDER — ONDANSETRON 2 MG/ML
4 INJECTION INTRAMUSCULAR; INTRAVENOUS
Status: DISCONTINUED | OUTPATIENT
Start: 2022-08-29 | End: 2022-09-03 | Stop reason: HOSPADM

## 2022-08-29 RX ORDER — BUPROPION HYDROCHLORIDE 300 MG/1
300 TABLET ORAL
Status: DISCONTINUED | OUTPATIENT
Start: 2022-08-29 | End: 2022-09-03 | Stop reason: HOSPADM

## 2022-08-29 RX ORDER — BUPROPION HYDROCHLORIDE 100 MG/1
200 TABLET, EXTENDED RELEASE ORAL
Status: DISCONTINUED | OUTPATIENT
Start: 2022-08-29 | End: 2022-08-29

## 2022-08-29 RX ORDER — SODIUM CHLORIDE 0.9 % (FLUSH) 0.9 %
10 SYRINGE (ML) INJECTION
Status: COMPLETED | OUTPATIENT
Start: 2022-08-29 | End: 2022-08-29

## 2022-08-29 RX ADMIN — ATENOLOL 25 MG: 25 TABLET ORAL at 22:03

## 2022-08-29 RX ADMIN — ACETAMINOPHEN 650 MG: 325 TABLET, FILM COATED ORAL at 19:15

## 2022-08-29 RX ADMIN — MONTELUKAST 10 MG: 10 TABLET, FILM COATED ORAL at 22:03

## 2022-08-29 RX ADMIN — BUPROPION HYDROCHLORIDE 300 MG: 300 TABLET ORAL at 22:32

## 2022-08-29 RX ADMIN — ENOXAPARIN SODIUM 40 MG: 100 INJECTION SUBCUTANEOUS at 22:02

## 2022-08-29 RX ADMIN — LEFLUNOMIDE 20 MG: 10 TABLET ORAL at 12:22

## 2022-08-29 RX ADMIN — ASPIRIN 81 MG CHEWABLE TABLET 81 MG: 81 TABLET CHEWABLE at 12:23

## 2022-08-29 RX ADMIN — GADOTERIDOL 18 ML: 279.3 INJECTION, SOLUTION INTRAVENOUS at 21:23

## 2022-08-29 RX ADMIN — SODIUM CHLORIDE, PRESERVATIVE FREE 10 ML: 5 INJECTION INTRAVENOUS at 21:23

## 2022-08-29 RX ADMIN — ATENOLOL 25 MG: 25 TABLET ORAL at 12:22

## 2022-08-29 RX ADMIN — LAMOTRIGINE 25 MG: 25 TABLET ORAL at 12:21

## 2022-08-29 RX ADMIN — SERTRALINE 200 MG: 50 TABLET, FILM COATED ORAL at 22:03

## 2022-08-29 RX ADMIN — PREDNISONE 10 MG: 10 TABLET ORAL at 12:21

## 2022-08-29 RX ADMIN — LEVOTHYROXINE SODIUM 175 MCG: 0.15 TABLET ORAL at 07:21

## 2022-08-29 RX ADMIN — LAMOTRIGINE 200 MG: 100 TABLET ORAL at 22:03

## 2022-08-29 RX ADMIN — PRAVASTATIN SODIUM 10 MG: 10 TABLET ORAL at 22:03

## 2022-08-29 NOTE — CONSULTS
PSYCHIATRY CONSULT NOTE    REASON FOR CONSULT:hx of chronic mental health. HISTORY OF PRESENTING COMPLAINT:  Margarito Mir is a 42 y.o. WHITE/NON- female who is currently admitted to the medical floor at [facility]. Patient presented to the ED due to intermittent confusion and inability to perform tasks and not recognizing known places. Upon assessment today, patient was initially upset that psych was consulted without her knowledge but she still agreed to participate in the assessment. She was calm and cooperative. She denies current depression but appears to be anxious. She reported having periods of intermittent confusion, intense headaches and cognitive slurring due to stress. She reports feeling worried and frustrated. Patient denies current suicidal/homicidal thoughts and AV hallucination. She reports experiencing \"auditory illusion\" one time when she was in her room, the water fountain was running and the bipap machine was on and she thought she heard people talking but when she turned off the equipments, she could no longer here voices. She also narrates a period where she got paranoid because she thought her colleagues  were talking about her. No paranoia or delusions were observed or reported during this assessment. She reports that she gets very sensitive to noise. She also reports that she has been sleeping too much, denied appetite concerns. PAST PSYCHIATRIC HISTORY:  No hx of psych admission  She reported she just started seeing a psychiatrist (unable to recall the name). She reported she takes Zoloft, Lamictal and Wellbutrin. However she stopped taking the Wellbutrin on Friday due to concerns of seizures. Denies hx of suicide attempt    SUBSTANCE ABUSE HISTORY: Reports she drinks wine occasionally but denies any other drug use like cocaine marijuana. PAST MEDICAL HISTORY:    Please see H&P for details.      Past Medical History:   Diagnosis Date    Asthma Cancer [facility])     thyroid papillary    Depression     HTN (hypertension)     Inflammatory arthritis      Prior to Admission medications    Medication Sig Start Date End Date Taking? Authorizing Provider   cholecalciferol (VITAMIN D3) (5000 Units/125 mcg) tab tablet Take 5,000 Units by mouth five (5) days a week. Yes Provider, Historical   lamoTRIgine (LaMICtal) 25 mg tablet Take 25 mg by mouth daily. (25 mg in the morning; 200 mg in the evening)   Yes Provider, Historical   levothyroxine (SYNTHROID) 175 mcg tablet Take 175 mcg by mouth five (5) days a week. (175 mcg x 5 days; 350 mcg on Sunday; 262.5 mcg on Wednesday)   Yes Provider, Historical   levothyroxine (SYNTHROID) 175 mcg tablet Take 350 mcg by mouth every Sunday. (175 mcg x 5 days; 350 mcg on Sunday; 262.5 mcg on Wednesday)   Yes Provider, Historical   levothyroxine (SYNTHROID) 175 mcg tablet Take 262.5 mcg by mouth every Wednesday. (175 mcg x 5 days; 350 mcg on Sunday; 262.5 mcg on Wednesday)   Yes Provider, Historical   acetaminophen (TYLENOL) 500 mg tablet Take 1,000 mg by mouth four (4) times daily as needed for Pain (headache). Yes Provider, Historical   therapeutic multivitamin (Thera) tablet Take 1 Tablet by mouth daily. Yes Provider, Historical   atenoloL (TENORMIN) 25 mg tablet Take 25 mg by mouth every twelve (12) hours. Yes Provider, Historical   baclofen (LIORESAL) 10 mg tablet Take 10 mg by mouth every twelve (12) hours. Yes Provider, Historical   leflunomide (ARAVA) 20 mg tablet Take 20 mg by mouth daily. Yes Provider, Historical   montelukast (SINGULAIR) 10 mg tablet Take 10 mg by mouth nightly. Yes Provider, Historical   pravastatin (PRAVACHOL) 10 mg tablet Take 10 mg by mouth nightly. Yes Provider, Historical   predniSONE (DELTASONE) 5 mg tablet Take 10 mg by mouth daily. Yes Provider, Historical   promethazine (PHENERGAN) 12.5 mg tablet Take 12.5 mg by mouth every six (6) hours as needed for Nausea.    Yes Provider, Historical   semaglutide (Ozempic) 1 mg/dose (2 mg/1.5 mL) sub-q pen 0.25 mg by SubCUTAneous route every other day. Yes Provider, Historical   sodium,calcium,mag,pot oxybate Rowlett Wynn) 0.5 gram/mL soln Take 2,750 mg by mouth nightly. (dose = 2750 mg = 5.5 mL)   Yes Provider, Historical   CAFFEINE PO Take 1,000 mg by mouth four (4) times daily as needed. ([employer])   Yes Provider, Historical   levalbuterol tartrate (XOPENEX) 45 mcg/actuation inhaler Take 2 Puffs by inhalation every four (4) hours as needed for Wheezing or Shortness of Breath. Yes Provider, Historical   Lisdexamfetamine (VYVANSE) 70 mg cap Take 70 mg by mouth daily. Yes Provider, Historical   lamoTRIgine (LaMICtal) 100 mg tablet Take 200 mg by mouth nightly. (25 mg in the morning; 200 mg in the evening)   Yes Provider, Historical   sertraline (ZOLOFT) 100 mg tablet Take 200 mg by mouth nightly. Yes Provider, Historical   buPROPion XL (WELLBUTRIN XL) 300 mg XL tablet Take 300 mg by mouth nightly. Yes Provider, Historical   aspirin delayed-release 81 mg tablet Take 81 mg by mouth daily. Indications: carotid artery disection with stent 3/26/16  Yes Provider, Historical   adalimumab (HUMRIA) 40 mg/0.8 mL injection 40 mg by SubCUTAneous route every Tuesday. Yes Provider, Historical   fexofenadine (ALLEGRA) 180 mg tablet Take 180 mg by mouth every twelve (12) hours.    Yes Provider, Historical     Vitals:    08/29/22 0400 08/29/22 0600 08/29/22 1000 08/29/22 1215   BP:  119/66  137/83   Pulse: 64 74 79 78   Resp:  21  15   Temp:  98.2 °F (36.8 °C)  98.6 °F (37 °C)   SpO2:  97%  97%   Weight:    81.9 kg (180 lb 9.6 oz)   Height:         Lab Results   Component Value Date/Time    WBC 11.7 (H) 08/28/2022 06:20 PM    HGB 14.5 08/28/2022 06:20 PM    HCT 41.5 08/28/2022 06:20 PM    PLATELET 059 64/38/6114 06:20 PM    MCV 97.6 08/28/2022 06:20 PM     Lab Results   Component Value Date/Time    Sodium 137 08/28/2022 06:20 PM    Potassium 4.1 08/28/2022 06:20 PM    Chloride 106 08/28/2022 06:20 PM    CO2 30 08/28/2022 06:20 PM    Anion gap 1 (L) 08/28/2022 06:20 PM    Glucose 89 08/28/2022 06:20 PM    BUN 11 08/28/2022 06:20 PM    Creatinine 0.70 08/28/2022 06:20 PM    BUN/Creatinine ratio 16 08/28/2022 06:20 PM    GFR est AA >60 08/28/2022 06:20 PM    GFR est non-AA >60 08/28/2022 06:20 PM    Calcium 9.1 08/28/2022 06:20 PM    Bilirubin, total 0.3 08/28/2022 06:20 PM    Alk. phosphatase 73 08/28/2022 06:20 PM    Protein, total 7.1 08/28/2022 06:20 PM    Albumin 3.7 08/28/2022 06:20 PM    Globulin 3.4 08/28/2022 06:20 PM    A-G Ratio 1.1 08/28/2022 06:20 PM    ALT (SGPT) 29 08/28/2022 06:20 PM    AST (SGOT) 18 08/28/2022 06:20 PM     No results found for: VALF2, VALAC, VALP, VALPR, DS6, CRBAM, CRBAMP, CARB2, XCRBAM  No results found for: LITHM  RADIOLOGY REPORTS:(reviewed/updated 8/29/2022)  MRA BRAIN WO CONT    Result Date: 8/28/2022  INDICATION: Confusion COMPARISON:  None TECHNIQUE:  3-D time-of-flight MRA of the brain was performed. 2-D time-of-flight MRA of the neck was performed. Multiplanar reconstructions were obtained. FINDINGS: MRA NECK Common Carotids/Internal Carotids (visualized segments): There is a short segment flow gap in the proximal left cervical internal carotid artery segment and slight reduction in caliber distally. There is 9 mm pseudoaneurysm along the ventral margin of the distal right cervical internal carotid artery segment Vertebral Arteries (visualized segments):  No flow limiting stenosis. MRA HEAD Posterior Circulation:  No flow limiting stenosis. Anterior Circulation:  No flow limiting stenosis. Other:  No aneurysm or vascular malformation. 1. Short segment flow gap proximal left cervical internal carotid artery segment could be due to occlusion or high-grade stenosis with patency distally.  2. Focal irregularity and pseudoaneurysm distal right cervical internal carotid artery segment consistent with chronic dissection/pseudoaneurysm. Recommend CTA head and neck. MRA NECK WO CONT    Result Date: 8/28/2022  INDICATION: Confusion COMPARISON:  None TECHNIQUE:  3-D time-of-flight MRA of the brain was performed. 2-D time-of-flight MRA of the neck was performed. Multiplanar reconstructions were obtained. FINDINGS: MRA NECK Common Carotids/Internal Carotids (visualized segments): There is a short segment flow gap in the proximal left cervical internal carotid artery segment and slight reduction in caliber distally. There is 9 mm pseudoaneurysm along the ventral margin of the distal right cervical internal carotid artery segment Vertebral Arteries (visualized segments):  No flow limiting stenosis. MRA HEAD Posterior Circulation:  No flow limiting stenosis. Anterior Circulation:  No flow limiting stenosis. Other:  No aneurysm or vascular malformation. 1. Short segment flow gap proximal left cervical internal carotid artery segment could be due to occlusion or high-grade stenosis with patency distally. 2. Focal irregularity and pseudoaneurysm distal right cervical internal carotid artery segment consistent with chronic dissection/pseudoaneurysm. Recommend CTA head and neck. MRI BRAIN WO CONT    Result Date: 8/28/2022  INDICATION:   confusion EXAMINATION:  MRI BRAIN WO CONTRAST COMPARISON:  None TECHNIQUE:  Multiplanar multisequence acquisition without contrast of the brain. FINDINGS:  Ventricles:  Midline, no hydrocephalus. Brain Parenchyma/Brainstem:  Normal for age. No acute infarction. Intracranial Hemorrhage:  None. Basal Cisterns:  Normal. Flow Voids:  Normal. Additional Comments:  N/A. No significant abnormality or acute process. No results found for: Jozef Jasmin [id number], [id number], [id number], PREGU, POCHCG, MHCGN, HCGQR, THCGA1, SHCG, HCGN, HCGSERUM, HCGURQLPOC    PSYCHOSOCIAL HISTORY: Patient reports she is , has no children and she is a nurse practitioner.         MENTAL STATUS EXAM:    General appearance:  appropriately  groomed, psychomotor activity is wnl  Eye contact: good eye contact  Speech: Spontaneous, soft, decreased output. Affect : Depressed, decreased range  Mood: \"normal \"  Thought Process: Logical, goal directed  Perception: Denies AH or VH. Thought Content: denies SI/HI or Plan  Insight: Partial  Judgement: Fair  Cognition: Intact grossly. ASSESSMENT AND PLAN:  Douglass Cooks meets criteria for a diagnosis of  Major depression and anxiety disorder by history. Continue with current home medications as prescribed. Patient wants to take her wellbutrin before her EEG procedure. The hospital does not carry wellbutrin XL but patient has hers which has to be verified by pharmacy. Communicated with the assigned nurse. She denies current SI/HI/AVH, no delusions or paranoia reported or observed. Based on assessment and chart review, patient does not seem to have any underlying issues to have cause her current medical concerns. She appears stable and psych admission is not indicated at this time. Thank your your consult. Please feel free to consult us again as needed.

## 2022-08-29 NOTE — PROGRESS NOTES
Problem: Self Care Deficits Care Plan (Adult)  Goal: *Acute Goals and Plan of Care (Insert Text)  Description: FUNCTIONAL STATUS PRIOR TO ADMISSION: Patient was independent with ADLs, IADLs, and functional mobility. 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 HOME SUPPORT: Patient resided with spouse and did not require assistance. Recently hired a  to keep her organized. Occupational Therapy Goals  Initiated 8/29/2022  1. Patient will perform a routine including 3 ADLs with modified independence and no cues within 7 days. 2.  Patient will perform simulated household IADLs with modified independence and no cues within 7 days. 3.  Patient will perform recall and locate 6 ADL objects with modified independence and no cues within 7 days. Outcome: Not Met     OCCUPATIONAL THERAPY EVALUATION  Patient: Maria Dolores Butts (23 y.o. female)  Date: 8/29/2022  Primary Diagnosis: Confusion [R41.0]       Precautions: none      ASSESSMENT  Note patient, who was highly independent and working as a neuro NP at baseline, admitted for neurological work-up d/t progressive recent confusion. She reports she has been on medical leave for a few weeks. She endorses recent challenges with working memory, frequently forgetting where she had placed objects or what she was doing. She also reports underlying ADHD but was very functional prior to recent cognitive decline. Patient presents today for OT evaluation A&Ox4, followed all commands, and participated well. Physical neuro exam intact, including visual functions, strength, AROM, sensation, and coordination. Patient ambulated throughout unit independently with intact balance and successfully avoided obstacles on both sides in environment. She was challenged to remember and perform 5 selected tasks, which she performed successfully recalling 4/5.   She was also challenged to recall and locate 6 familiar objects throughout hospital environment, and was able to recall 5/6 without cuing. Patient successfully completed both challenges to entirety with minimal cuing. Her performance with limited by mildly impaired working memory and moderately impaired attention to task. She has already started compensatory techniques to promote daily function and was receptive to additional strategies. Will follow for OT 2x/ week in acute setting. She would benefit from outpatient OT after d/c to address cognitive function applied to daily tasks. Current Level of Function Impacting Discharge (ADLs/self-care): independent    Functional Outcome Measure: The patient scored 66/66 with each UE on the Barthel Index outcome measure which is indicative of ~0% impairment in functional performance. Patient will benefit from skilled therapy intervention to address the above noted impairments. PLAN :  Recommendations and Planned Interventions: therapeutic activities, cognitive retraining, patient education, home safety training, and family training/education    Frequency/Duration: Patient will be followed by occupational therapy 2 times a week to address goals. Recommendation for discharge: (in order for the patient to meet his/her long term goals)  Outpatient occupational therapy follow up recommended for cognitive function applied to daily tasks    This discharge recommendation:  Has been made in collaboration with the attending provider and/or case management         SUBJECTIVE:   Patient stated \"I've had trouble with my working memory.     OBJECTIVE DATA SUMMARY:   HISTORY:   Past Medical History:   Diagnosis Date    Asthma     Cancer ([facility] [address]     thyroid papillary    Depression     HTN (hypertension)     Inflammatory arthritis      Past Surgical History:   Procedure Laterality Date    HX OTHER SURGICAL  2016    carotid artery dissection    HX THYROIDECTOMY  1999    total    HX TONSIL AND ADENOIDECTOMY  1984       Expanded or extensive additional review of patient history:     Home Situation  Patient Expects to be Discharged to[de-identified] Home      EXAMINATION OF PERFORMANCE DEFICITS:  Cognitive/Behavioral Status:  Neurologic State: Alert  Orientation Level: Oriented X4  Cognition: Follows commands;Decreased attention/concentration  Perception: Appears intact  Perseveration: No perseveration noted  Safety/Judgement: Insight into deficits    Skin: visible skin appears intact    Edema: none noted    Hearing: WDL    Vision/Perceptual:    Tracking: Able to track stimulus in all quadrants w/o difficulty    Saccades: Within Defined Limits         Visual Fields:  (intact)  Diplopia: No    Acuity: Within Defined Limits    Corrective Lenses: Glasses    Range of Motion:    AROM: Within functional limits                         Strength:    Strength: Within functional limits                Coordination:  Coordination: Within functional limits  Fine Motor Skills-Upper: Left Intact; Right Intact    Gross Motor Skills-Upper: Left Intact; Right Intact    Tone & Sensation:    Tone: Normal  Sensation: Intact                      Balance:  Sitting: Intact  Standing: Intact    Functional Mobility and Transfers for ADLs:  Bed Mobility:  Supine to Sit: Independent  Sit to Supine: Independent    Transfers:  Sit to Stand: Independent  Stand to Sit: Independent    ADL Assessment:  Feeding: Independent    Oral Facial Hygiene/Grooming: Independent    Bathing: Independent         Upper Body Dressing: Independent    Lower Body Dressing: Independent    Toileting: Independent                  ADL Intervention and task modifications:     Cognitive Retraining  Safety/Judgement: Insight into deficits      Functional Measure:  Fugl-Mahan Assessment of Motor Recovery after Stroke:     Upper Extremity Assessment: Yes (each UE)    Reflex Activity  Flexors/Biceps/Fingers: Can be elicited  Extensors/Triceps: Can be elicited  Reflex Subtotal: 4    Volitional Movement Within Synergies  Shoulder Retraction: Full  Shoulder Elevation: Full  Shoulder Abduction (90 degrees): Full  Shoulder External Rotation: Full  Elbow Flexion: Full  Forearm Supination: Full  Shoulder Adduction/Internal Rotation: Full  Elbow Extension: Full  Forearm Pronation: Full  Subtotal: 18    Volitional Movement Mixing Synergies  Hand to Lumbar Spine: Full  Shoulder Flexion (0-90 degrees): Full  Pronation-Supination: Full  Subtotal: 6    Volitional Movement With Little or No Synergy  Shoulder Abduction (0-90 degrees): Full  Shoulder Flexion ( degrees): Full  Pronation/Supination: Full  Subtotal : 6    Normal Reflex Activity  Biceps, Triceps, Finger Flexors: Full  Subtotal : 2    Upper Extremity Total   Upper Extremity Total: 36    Wrist  Stability at 15 Degree Dorsiflexion: Full  Repeated Dorsiflexion/ Volar Flexion: Full  Stability at 15 Degree Dorsiflexion: Full  Repeated Dorsiflexion/ Volar Flexion: Full  Circumduction: Full  Wrist Total: 10    Hand  Mass Flexion: Full  Mass Extension: Full  Grasp A: Full  Grasp B: Full  Grasp C: Full  Grasp D: Full  Grasp E: Full  Hand Total: 14    Coordination/Speed  Tremor: None  Dysmetria: None  Time: <1s  Coordination/Speed Total : 6    Total A-D  Total A-D (Motor Function): 66/66         This is a reliable/valid measure of arm function after a neurological event. It has established value to characterize functional status and for measuring spontaneous and therapy-induced recovery; tests proximal and distal motor functions. Fugl-Mahan Assessment - UE scores recorded between five and 30 days post neurologic event can be used to predict UE recovery at six months post neurologic event. Severe = 0-21 points   Moderately Severe = 22-33 points   Moderate = 34-47 points   Mild = 48-66 points  NADEEN Garg, SREEDHAR Reich, & ARIE Marvin (1992). Measurement of motor recovery after stroke: Outcome assessment and sample size requirements. Stroke, 23, pp. 0086-9637.   --------------------------------------------------------------------------------------------------------------------------------------------------------------------  MCID:  Stroke:   Lindsay Smith et al, 2001; n = 171; mean age 79 (6) years; assessed within 16 (12) days of stroke, Acute Stroke)  FMA Motor Scores from Admission to Discharge   10 point increase in FMA Upper Extremity = 1.5 change in discharge FIM   10 point increase in FMA Lower Extremity = 1.9 change in discharge FIM  MDC:   Stroke:   Jodi De Guzman et al, 2008, n = 14, mean age = 59.9 (14.6) years, assessed on average 14 (6.5) months post stroke, Chronic Stroke)   FMA = 5.2 points for the Upper Extremity portion of the assessment         Occupational Therapy Evaluation Charge Determination   History Examination Decision-Making   LOW Complexity : Brief history review  LOW Complexity : 1-3 performance deficits relating to physical, cognitive , or psychosocial skils that result in activity limitations and / or participation restrictions  MEDIUM Complexity : Patient may present with comorbidities that affect occupational performnce. Miniml to moderate modification of tasks or assistance (eg, physical or verbal ) with assesment(s) is necessary to enable patient to complete evaluation       Based on the above components, the patient evaluation is determined to be of the following complexity level: LOW   Pain Rating:  Patient did not report pain    Activity Tolerance:   Good    After treatment patient left in no apparent distress:    Supine in bed and Call bell within reach    COMMUNICATION/EDUCATION:   The patients plan of care was discussed with: Physical therapist, Speech therapist, and Registered nurse. Home safety education was provided and the patient/caregiver indicated understanding., Patient/family have participated as able in goal setting and plan of care. , and Patient/family agree to work toward stated goals and plan of care.       Patient and/or family was verbally educated on the BE FAST acronym for signs/symptoms of CVA and TIA. BE FAST was written on patient's communication board  for visual education and reinforcement. All questions answered with patient indicating good understanding. This patients plan of care is appropriate for delegation to [facility].     Thank you for this referral.  Neena Dietz, OT  Time Calculation: 52 mins

## 2022-08-29 NOTE — ED NOTES
TRANSFER - OUT REPORT:    Verbal report given to Georgi Jean-Baptiste RN(name) on Phi Mahoney  being transferred to NSTU bed 674(unit) for routine progression of care       Report consisted of patients Situation, Background, Assessment and   Recommendations(SBAR). Information from the following report(s) SBAR, ED Summary, Procedure Summary, Intake/Output, MAR, Recent Results, Med Rec Status and Cardiac Rhythm nsr was reviewed with the receiving nurse. Lines:   Peripheral IV 08/28/22 Right Antecubital (Active)        Opportunity for questions and clarification was provided.       Patient transported with:

## 2022-08-29 NOTE — PROGRESS NOTES
SPEECH LANGUAGE PATHOLOGY EVALUATION  Patient: Aliyah Stratton (29 y.o. female)  Date: 8/29/2022  Primary Diagnosis: Confusion [R41.0]       Precautions:        ASSESSMENT :  Based on the objective data described below, the patient presents with intact motor speech. Patient reports occasional lisp however no motor speech weakness or change in speech noted during session. Patient provides lengthy description of h/o (approximately 1 year ago) weakness on right side of tongue, mouth, velum and neck for which she completed exercises and is now demonstrating functional movement. Patient reports sensation of lingual weakness on left during lisping incident. Patient also reports word retrieval deficits and \"cognitive slowing. \"  Note patient verbose, tangential and with some reduced content during session. Occasional slow response to to stimuli noted. Patient reports anxiety and frustration regarding MRI with contrast and EEG tests and declines language assessment at this time. SLP to follow for language assessment as medically appropriate - patient may complete this as an outpatient as well. Patient will benefit from skilled intervention to address the above impairments. Patients rehabilitation potential is considered to be Fair     PLAN :  Recommendations and Planned Interventions:  SLP to follow for language assessment  Frequency/Duration: Patient will be followed by speech-language pathology 2 times a week to address goals. Discharge Recommendations: To Be Determined     SUBJECTIVE:   Patient stated I am very frustrated right now. . Patient siting upright in bed. Patient and RN report no difficulty swallowing.     OBJECTIVE:     Past Medical History:   Diagnosis Date    Asthma     Cancer ([address]     thyroid papillary    Depression     HTN (hypertension)     Inflammatory arthritis      Past Surgical History:   Procedure Laterality Date    HX OTHER SURGICAL  2016    carotid artery dissection    HX THYROIDECTOMY  1999    total    HX TONSIL AND ADENOIDECTOMY  1984     Prior Level of Function/Home Situation:   Home Situation  Patient Expects to be Discharged to[de-identified] Home  Mental Status:  Neurologic State: Alert  Orientation Level: Oriented to person, Oriented to place, Oriented to situation, Oriented to time  Cognition: Follows commands           Motor Speech:  Oral-Motor Structure/Motor Speech  Labial: No impairment  Dentition: Intact  Oral Hygiene: Moist and clean oral mucosa  Lingual: No impairment  Velum: No impairment  Mandible: No impairment  Apraxic Characteristics: None  Dysarthric Characteristics: None  Intelligibility: No impairment        NOMS: The NOMS functional outcome measure was used to quantify this patient's level of motor speech impairment. Based on the NOMS, the patient was determined to be at level 7 for motor speech function. NOMS Motor Speech:  Level 1 (CN):  100% unintelligible  Level 2 (CM):  Communication partner responsible for message; can do CV or automatic words w/ max cues but rarely intelligible in context  Level 3 (CL): communication partner primarily responsible for message but says CV/automatic words intelligibly; mod cues to say simple words/phrases  Level 4 (CK): In structured conversation with familiar listener can say simple words and phrases. Mod cues for simple sentences  Level 5 (CJ):  Uses simple sentences for ADLs with familiar and unfamiliar listener; min cues for complex sentences  Level 6 (CI):  Intelligible in ADLs; difficulty in voc/social activites; rare cues for complex message; uses comp strategies  Level 7 ([address] [place] [address]):  Intelligible in all activities. May occasionally use compensatory strategies. ELIZABETH. (2003). National Outcomes Measurement System (NOMS): Adult Speech-Language Pathology User's Guide.           After treatment:   Patient left in no apparent distress in bed, Call bell within reach, and Nursing notified    COMMUNICATION/EDUCATION:   Patient was educated regarding role of SLP and POC. Patient nodding agreement but also rather verbose, tangential and off topic. The patient's plan of care including recommendations, planned interventions, and recommended diet changes were discussed with: Registered nurse. Patient/family have participated as able in goal setting and plan of care. Patient/family agree to work toward stated goals and plan of care.     Thank you for this referral.  LATOYA Huntley  Time Calculation: 25 mins

## 2022-08-29 NOTE — PROGRESS NOTES
Belinda Smith provided to patient/representative with verbal explanation of the notice. Time allotted for questions regarding the notice. Patient /representative provided a completed copy of the VOON notice. Copy placed on bedside chart.   Aayush Peacock, Care Management Assistant

## 2022-08-29 NOTE — CONSULTS
NEUROLOGY CONSULT NOTE    Name Adrian Naylor Age 42 y.o. 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 She has also noted rather severe chronic migrainous headaches since  occurring daily. Due to cognitive symptoms and to exclude potential ictal focus, rapid EEG was performed on admission and without epileptiform activity. Lower suspicion for seizure activity. MRI Brain/MRA head and neck did not reveal acute intracranial pathology, noted L cervical ICA flow gap and R cervical ICA chronic dissection/pseudoaneurysm. No clear etiology identified for her cognitive complaints thus far. Examination appears non-focal. Orientation is preserved, delayed recall 4/5. We will proceed with contrasted brain MRI as well as lumbar puncture to exclude autoimmune/inflammatory encephalitis, less likely CNS infection. If addition investigations are unrevealing, we discussed the possibility of confusional migraine variant as a diagnosis of exclusion. Recommendations:   MRI Brain w/contrast  Routine EEG  LP under IR following completion of above imaging sending for routine analysis, CSF cx, NMDAr ab, Lyme, HSV, VDRL, CMV    Thank you very much for this referral. I appreciate the opportunity to participate in this patient's care. History of Present Illness: This is a 42 y.o.   female, we were asked to see for AMS, headaches. PMH notable or migraines, seronegative inflammatory arthritis, narcolepsy, thyroid CA, depression, HTN, carotid dissection.  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 She reports some difficulty with short term memory x 2 weeks, cognitive processing delays, word finding deficits. In addition, she mentions rather severe headaches over the R frontal region since June occurring daily with nausea, photophobia. These are being managed by a Teleneurologist, currently maintained on Baclofen with some attenuated severity but not frequency. She is also taking Lamictal, Sertraline, Wellbutrin for depression. She denies recent fevers, cervicalgia, focal weakness/numbness. She admits to intermittent L blurred vision. She is concerned regarding the possibility of seizure disorder due to her cognitive symptoms and states she was discharged from [employer] recently after presenting with above complaints without any significant investigations. B12 and thyroid were recently sent and normal. She denies h/o seizure d/o, TBI, FHx seizures. Rapid EEG this admission did not reveal epileptiform activity. MRI Brain/MRA head and neck did not reveal acute intracranial pathology, noted L cervical ICA flow gap and R cervical ICA chronic dissection/pseudoaneurysm. Of note, she is followed by Dr. Callie Roger as well as neurointerventional surgery. Allergies   Allergen Reactions    Albuterol Palpitations    Bactrim [Sulfamethoprim Ds] Other (comments)     Elevated liver enzymes    Lisinopril Cough and Angioedema    Olmesartan Angioedema    Spironolactone Angioedema        Prior to Admission medications    Medication Sig Start Date End Date Taking? Authorizing Provider   cholecalciferol (VITAMIN D3) (5000 Units/125 mcg) tab tablet Take 5,000 Units by mouth five (5) days a week. Yes Provider, Historical   lamoTRIgine (LaMICtal) 25 mg tablet Take 25 mg by mouth daily. (25 mg in the morning; 200 mg in the evening)   Yes Provider, Historical   levothyroxine (SYNTHROID) 175 mcg tablet Take 175 mcg by mouth five (5) days a week.  (175 mcg x 5 days; 350 mcg on Sunday; 262.5 mcg on Wednesday)   Yes Provider, Historical   levothyroxine (SYNTHROID) 175 mcg tablet Take 350 mcg by mouth every Sunday. (175 mcg x 5 days; 350 mcg on Sunday; 262.5 mcg on Wednesday)   Yes Provider, Historical   levothyroxine (SYNTHROID) 175 mcg tablet Take 262.5 mcg by mouth every Wednesday. (175 mcg x 5 days; 350 mcg on Sunday; 262.5 mcg on Wednesday)   Yes Provider, Historical   acetaminophen (TYLENOL) 500 mg tablet Take 1,000 mg by mouth four (4) times daily as needed for Pain (headache). Yes Provider, Historical   therapeutic multivitamin (Thera) tablet Take 1 Tablet by mouth daily. Yes Provider, Historical   atenoloL (TENORMIN) 25 mg tablet Take 25 mg by mouth every twelve (12) hours. Yes Provider, Historical   baclofen (LIORESAL) 10 mg tablet Take 10 mg by mouth every twelve (12) hours. Yes Provider, Historical   leflunomide (ARAVA) 20 mg tablet Take 20 mg by mouth daily. Yes Provider, Historical   montelukast (SINGULAIR) 10 mg tablet Take 10 mg by mouth nightly. Yes Provider, Historical   pravastatin (PRAVACHOL) 10 mg tablet Take 10 mg by mouth nightly. Yes Provider, Historical   predniSONE (DELTASONE) 5 mg tablet Take 10 mg by mouth daily. Yes Provider, Historical   promethazine (PHENERGAN) 12.5 mg tablet Take 12.5 mg by mouth every six (6) hours as needed for Nausea. Yes Provider, Historical   semaglutide (Ozempic) 1 mg/dose (2 mg/1.5 mL) sub-q pen 0.25 mg by SubCUTAneous route every other day. Yes Provider, Historical   sodium,calcium,mag,pot oxybate Parvin Primes) 0.5 gram/mL soln Take 2,750 mg by mouth nightly. (dose = 2750 mg = 5.5 mL)   Yes Provider, Historical   CAFFEINE PO Take 1,000 mg by mouth four (4) times daily as needed. ([employer])   Yes Provider, Historical   levalbuterol tartrate (XOPENEX) 45 mcg/actuation inhaler Take 2 Puffs by inhalation every four (4) hours as needed for Wheezing or Shortness of Breath.    Yes Provider, Historical   Lisdexamfetamine (VYVANSE) 70 mg cap Take 70 mg by mouth daily. Yes Provider, Historical   lamoTRIgine (LaMICtal) 100 mg tablet Take 200 mg by mouth nightly. (25 mg in the morning; 200 mg in the evening)   Yes Provider, Historical   sertraline (ZOLOFT) 100 mg tablet Take 200 mg by mouth nightly. Yes Provider, Historical   buPROPion XL (WELLBUTRIN XL) 300 mg XL tablet Take 300 mg by mouth nightly. Yes Provider, Historical   aspirin delayed-release 81 mg tablet Take 81 mg by mouth daily. Indications: carotid artery disection with stent 3/26/16  Yes Provider, Historical   adalimumab (HUMRIA) 40 mg/0.8 mL injection 40 mg by SubCUTAneous route every Tuesday. Yes Provider, Historical   fexofenadine (ALLEGRA) 180 mg tablet Take 180 mg by mouth every twelve (12) hours. Yes Provider, Historical       Past Medical History:   Diagnosis Date    Asthma     Cancer ([facility] [address]     thyroid papillary    Depression     HTN (hypertension)     Inflammatory arthritis         Past Surgical History:   Procedure Laterality Date    HX OTHER SURGICAL  2016    carotid artery dissection    HX THYROIDECTOMY  1999    total    HX TONSIL AND ADENOIDECTOMY  1984        Social History     Tobacco Use    Smoking status: Never    Smokeless tobacco: Never   Substance Use Topics    Alcohol use: No        Family History   Problem Relation Age of Onset    Stroke Father     Diabetes Father     Hypertension Father     Hypertension Sister     Breast Cancer Sister     Heart Attack Sister         Review of Systems:   Comprehensive review of systems performed and negative except for as listed above.      Exam:     Visit Vitals  /86 (BP 1 Location: Right upper arm, BP Patient Position: At rest;Sitting)   Pulse 69   Temp 98.1 °F (36.7 °C)   Resp 12   Ht 5' 7\" (1.702 m)   Wt 180 lb 9.6 oz (81.9 kg)   SpO2 97%   BMI 28.29 kg/m²        General: Patient in no apparent distress   Head: Normocephalic, atraumatic, anicteric sclera   Lungs:  Clear to auscultation bilaterally, No wheezes or rubs   Cardiac: Regular rate and rhythm with no murmurs. Abd: Non-distended    Ext: No pedal edema   Skin: No overt signs of rash     Neurological Exam:  Mental Status: Alert and oriented to person place and time. Serial 7's intact. Delayed recall 4/5. Speech: Fluent no aphasia or dysarthria although intermittent mildly delayed responses. Cranial Nerves:   Intact visual fields. Facial sensation is normal. Facial movement is symmetric. Tongue is midline. Hearing is intact bilaterally. Eyes: PERRL, EOM's full, no nystagmus, no ptosis. Motor:  Full and symmetric strength of upper and lower proximal and distal muscles. Normal bulk and tone. Reflexes:   Deep tendon reflexes 2+/4 and symmetrical.  Plantar response is downgoing b/l. Sensory:   Symmetrically intact  with no perceived deficits modalities involving small or large fibers. Gait:  Gait is deferred   Tremor:   No tremor noted. Cerebellar:  No ataxia on FTN b/l. Imaging  CT Results (maximum last 3): No results found for this or any previous visit. MRI Results (maximum last 3): Results from [place] encounter on 08/28/22    MRA NECK WO CONT    Narrative  INDICATION: Confusion    COMPARISON:  None    TECHNIQUE:  3-D time-of-flight MRA of the brain was performed. 2-D  time-of-flight MRA of the neck was performed. Multiplanar reconstructions were  obtained. FINDINGS:    MRA NECK    Common Carotids/Internal Carotids (visualized segments): There is a short  segment flow gap in the proximal left cervical internal carotid artery segment  and slight reduction in caliber distally. There is 9 mm pseudoaneurysm along the  ventral margin of the distal right cervical internal carotid artery segment  Vertebral Arteries (visualized segments):  No flow limiting stenosis. MRA HEAD    Posterior Circulation:  No flow limiting stenosis. Anterior Circulation:  No flow limiting stenosis. Other:  No aneurysm or vascular malformation. Impression  1.  Short segment flow gap proximal left cervical internal carotid artery segment  could be due to occlusion or high-grade stenosis with patency distally. 2. Focal irregularity and pseudoaneurysm distal right cervical internal carotid  artery segment consistent with chronic dissection/pseudoaneurysm. Recommend CTA head and neck. MRA BRAIN WO CONT    Narrative  INDICATION: Confusion    COMPARISON:  None    TECHNIQUE:  3-D time-of-flight MRA of the brain was performed. 2-D  time-of-flight MRA of the neck was performed. Multiplanar reconstructions were  obtained. FINDINGS:    MRA NECK    Common Carotids/Internal Carotids (visualized segments): There is a short  segment flow gap in the proximal left cervical internal carotid artery segment  and slight reduction in caliber distally. There is 9 mm pseudoaneurysm along the  ventral margin of the distal right cervical internal carotid artery segment  Vertebral Arteries (visualized segments):  No flow limiting stenosis. MRA HEAD    Posterior Circulation:  No flow limiting stenosis. Anterior Circulation:  No flow limiting stenosis. Other:  No aneurysm or vascular malformation. Impression  1. Short segment flow gap proximal left cervical internal carotid artery segment  could be due to occlusion or high-grade stenosis with patency distally. 2. Focal irregularity and pseudoaneurysm distal right cervical internal carotid  artery segment consistent with chronic dissection/pseudoaneurysm. Recommend CTA head and neck. MRI BRAIN WO CONT    Narrative  INDICATION:   confusion    EXAMINATION:  MRI BRAIN WO CONTRAST    COMPARISON:  None    TECHNIQUE:  Multiplanar multisequence acquisition without contrast of the brain. FINDINGS:    Ventricles:  Midline, no hydrocephalus. Brain Parenchyma/Brainstem:  Normal for age. No acute infarction. Intracranial Hemorrhage:  None. Basal Cisterns:  Normal.  Flow Voids:  Normal.  Additional Comments:  N/A.     Impression  No significant abnormality or acute process. Lab Review  Lab Results   Component Value Date/Time    WBC 11.7 (H) 08/28/2022 06:20 PM    HCT 41.5 08/28/2022 06:20 PM    HGB 14.5 08/28/2022 06:20 PM    PLATELET 013 66/12/5360 06:20 PM     Lab Results   Component Value Date/Time    Sodium 137 08/28/2022 06:20 PM    Potassium 4.1 08/28/2022 06:20 PM    Chloride 106 08/28/2022 06:20 PM    CO2 30 08/28/2022 06:20 PM    Glucose 89 08/28/2022 06:20 PM    BUN 11 08/28/2022 06:20 PM    Creatinine 0.70 08/28/2022 06:20 PM    Calcium 9.1 08/28/2022 06:20 PM     No components found for: TROPQUANT  No results found for: DARLENE    Signed:  Roya Alert.  Clovis Sanz DO  8/29/2022  3:43 PM no stated FHx. no stated FHx.

## 2022-08-29 NOTE — BSMART NOTE
BSMART Liaison Team Note     LOS:  0     Patient goal(s) for today: take medications as prescribed, make needs known in an appropriate manner, utilize coping skills  [facility] Liaison team focus/goals: assess needs, provide support    Progress note: Attempted to meet with patient. She was on the phone but ended her call to talk to this writer. 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 She reports she has been having difficulty with her speech due to thought blocking like behaviors and not thinking clearly. She reports that she has been frustrated and has safety concerns. When asked for an example she gave one regarding a test she was to have that she felt was not being done correctly. While she was explaining this, patient advocate representative entered room. Asked patient if this writer could come back tomorrow to talk as the advocate was here to listen to some of the concerns she was mentioning to this writer. Patient agreed. She reports planning to have her virtual therapy session tomorrow at 8 am so this writer will follow up after 9 am. Patient agreeable with this plan. Barriers to Discharge: medical treatment  Guns in the home: unknown     Outpatient provider(s):  therapist  Insurance info/prescription coverage:  [employer]    Diagnosis:   Past Medical History:   Diagnosis Date    Asthma     Cancer ([facility] [address]     thyroid papillary    Depression     HTN (hypertension)     Inflammatory arthritis         Plan:  Defer to medical team. Per psych NP, patient does not currently meet inpatient psychiatric criteria but this writer will follow up tomorrow with patient to complete full assessment.    Follow up Psych Consult placed? no.   Psychiatrist updated? no       Participating treatment team members: Gingre Gilmore, [address] [place] [address]

## 2022-08-29 NOTE — PROGRESS NOTES
[address] [facility] Adult  Hospitalist Group                                                                                          Hospitalist Progress Note  Holly Erickson MD  Answering service: [phone] OR [phone] from in house phone        Date of Service:  2022  NAME:  Emma Jaquez  :  1975  MRN:  [id number]      Admission Summary:   HPI: Marina Mueller is a 42 y.o. 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 Pt arrives for intermittent confusion, periods of inability to perform known tasks and not recognizing known places. These symptoms have been persistent x 2 weeks, she was seen at [facility] ED on Friday, had neuro workup and eval to include CTA head and neck and neurologist eval and dc'd with plan to follow up this week with neuro but now has concern for seizure activity due to presenting symptoms. Pt appears very anxious and has scattered thought processes with difficulty explaining symptoms. A&OX4, GCS 15. Denies any recent trauma or injuries.  .  Telemetry neurology eval would recommend admission for possible focal seizures, also recommend a brain MRI, MRA\"       Interval history / Subjective:   Awake, alert, states that she has been followed by [facility] but wishes to come to [facility] services with long history unable to explain her mental health state/memory lapses/headaches/etc.     Assessment & Plan:     History of AMS/chronic confusion  -pt reports long history of memory lapses, headaches, etc which she gets all her care at [facility] and wishes to get her care at [facility] now.  -mri/mra noted  -pending eeg  -neurology consulted    History of mental mariana/depression/anxiety  -pt states she has narcolepsy as well, on stimulants  -does not voice si/hi/hallucinations  -wellbutrin held on admission and was resumed her other home meds    Hypothyroidism  -on replacement tx    Hypertension  -asymptomatic  -monitor/trend  -adjust meds as needed    See other orders as plan     Code status: full  Prophylaxis: lovenox  Care Plan discussed with: pt  Anticipated Disposition: tbd, pending tests    Ordered psych consult     Hospital Problems  Date Reviewed: 3/1/2021            Codes Class Noted POA    Confusion ICD-10-CM: R41.0  ICD-9-CM: 298.9  8/28/2022 Unknown             Review of Systems:   A comprehensive review of systems was negative except for that written in the HPI. Vital Signs:    Last 24hrs VS reviewed since prior progress note. Most recent are:  Visit Vitals  /66 (BP 1 Location: Right upper arm, BP Patient Position: At rest)   Pulse 74   Temp 98.2 °F (36.8 °C)   Resp 21   Ht 5' 7\" (1.702 m)   Wt 85.7 kg (189 lb)   SpO2 97%   BMI 29.60 kg/m²       No intake or output data in the 24 hours ending 08/29/22 7519     Physical Examination:     I had a face to face encounter with this patient and independently examined them on 8/29/2022 as outlined below:          Constitutional:  No acute distress, cooperative, pleasant    ENT:  Oral mucosa moist, oropharynx benign. Resp:  CTA bilaterally. No wheezing/rhonchi/rales. No accessory muscle use. CV:  Regular rhythm, normal rate, no murmurs, gallops, rubs    GI:  Soft, non distended, non tender. normoactive bowel sounds, no hepatosplenomegaly     Musculoskeletal:  No edema, warm, 2+ pulses throughout    Neurologic:  Moves all extremities.   AAOx3, CN II-XII reviewed            Data Review:    Review and/or order of clinical lab test      Labs:     Recent Labs     08/28/22 1820   WBC 11.7*   HGB 14.5   HCT 41.5        Recent Labs     08/28/22 1820      K 4.1      CO2 30   BUN 11   CREA 0.70   GLU 89   CA 9.1     Recent Labs     08/28/22 1820   ALT 29   AP 73   TBILI 0.3   TP 7.1   ALB 3.7   GLOB 3.4       Lab Results   Component Value Date/Time    Color YELLOW/STRAW 08/28/2022 06:20 PM    Appearance CLEAR 08/28/2022 06:20 PM    Specific gravity 1.011 08/28/2022 06:20 PM    pH (UA) 6.5 08/28/2022 06:20 PM    Protein Negative 08/28/2022 06:20 PM    Glucose Negative 08/28/2022 06:20 PM    Ketone Negative 08/28/2022 06:20 PM    Bilirubin Negative 08/28/2022 06:20 PM    Urobilinogen 0.2 08/28/2022 06:20 PM    Nitrites Negative 08/28/2022 06:20 PM    Leukocyte Esterase Negative 08/28/2022 06:20 PM         Medications Reviewed:     Current Facility-Administered Medications   Medication Dose Route Frequency    ondansetron (ZOFRAN) injection 4 mg  4 mg IntraVENous Q6H PRN    lactulose (CHRONULAC) 10 gram/15 mL solution 15 mL  10 g Oral DAILY PRN    acetaminophen (TYLENOL) tablet 650 mg  650 mg Oral Q4H PRN    Or    acetaminophen (TYLENOL) solution 650 mg  650 mg Per NG tube Q4H PRN    Or    acetaminophen (TYLENOL) suppository 650 mg  650 mg Rectal Q4H PRN    aspirin chewable tablet 81 mg  81 mg Oral DAILY    enoxaparin (LOVENOX) injection 40 mg  40 mg SubCUTAneous Q24H    atenoloL (TENORMIN) tablet 25 mg  25 mg Oral Q12H    . PHARMACY TO SUBSTITUTE PER PROTOCOL (Reordered from: fexofenadine (ALLEGRA) 180 mg tablet)    Per Protocol    lamoTRIgine (LaMICtal) tablet 200 mg  200 mg Oral QHS    lamoTRIgine (LaMICtal) tablet 25 mg  25 mg Oral DAILY    leflunomide (ARAVA) tablet 20 mg  20 mg Oral DAILY    levothyroxine (SYNTHROID) tablet 175 mcg  175 mcg Oral Once per day on Mon Tue Thu Fri Sat    [START ON 8/31/2022] levothyroxine (SYNTHROID) tablet 262.5 mcg  262.5 mcg Oral every Wednesday    montelukast (SINGULAIR) tablet 10 mg  10 mg Oral QHS    pravastatin (PRAVACHOL) tablet 10 mg  10 mg Oral QHS    predniSONE (DELTASONE) tablet 10 mg  10 mg Oral DAILY    sertraline (ZOLOFT) tablet 200 mg  200 mg Oral QHS    . PHARMACY TO SUBSTITUTE PER PROTOCOL (Reordered from: Lisdexamfetamine (VYVANSE) 70 mg cap)    Per Protocol    sodium,calcium,mag,pot oxybate 0.5 gram/mL soln 2,750 mg 2,750 mg Oral QHS     ______________________________________________________________________  EXPECTED LENGTH OF STAY: - - -  ACTUAL LENGTH OF STAY:          0                 Bonifacio Kitchen MD

## 2022-08-29 NOTE — PROCEDURES
ELECTROENCEPHALOGRAM REPORT     Patient Name: Angeline White  : 1975  Age: 42 y.o. Date of EEG: 2022  Interpreting physician: Leonel Agee MD    PROCEDURE: > 2 hour Ceribell Rapid 10 lead circumferential electroencephalogram (EEG). CLINICAL INDICATION: The patient is a 42 y.o. female who is being evaluated for chronic, worsening confusion. DESCRIPTION OF THE RECORD:   During wakefulness, there is a well-formed posterior dominant alpha rhythm with incompletely formed anterior to posterior frequency/amplitude gradient. Transition to drowsiness is manifest by fragmentation of the waking background with progressive increase in diffuse theta. No epileptiform discharges or electrographic seizures are noted. Unable to comment on reactivity given lack of annotation, video. INTERPRETATION:     This is a normal awake and drowsy > 2 hour Ceribell Rapid EEG. Clinical correlation is advised, could consider traditional EEG/ambulatory monitoring if concern for seizure persists.     Hossein Duenas MD

## 2022-08-29 NOTE — PROGRESS NOTES
Physical Therapy Note    PT orders received and acknowledged. Chart reviewed. Patient denies the need for acute PT evaluation. She is up ad-vivian. She reports no concerns or change in her mobility. Will complete PT orders at this time.

## 2022-08-29 NOTE — PROGRESS NOTES
Messaged DINA Wise after pt arrived on unit at 2 am about pt's concerns. Pt said she needs an MRI with contrast and EEG. Lorenzo Warner put in EEG and also cerebell. Applied cerebell at 0400, removed about 0700. Pt is oriented but complains of some word finding difficulty and forgetfulness. Neuro assessment otherwise intact.

## 2022-08-29 NOTE — ED NOTES
Pt on call bell, standing in doorway, has returned from MRI, would like for MRI order clarified, feels she should have received the study with contrast per her conversation with the Neurologist; placed pt back on CM and will notify hospitalist.

## 2022-08-29 NOTE — PROGRESS NOTES
Transition of Care Plan: Likely home when medically stable    RUR: N/A    PCP F/U: Dr. Corrine Olivarez    Disposition: Likely home when medically stable    Transportation: family    Main Contact: : Bartolome Nunes [phone]: Attempted to meet with patient, but clinical staff at the bedside. Has given this CM permission to contact , but unable to contact. Left  a VM to return call. OT recommending OP for cognitive function. Will try to meet with patient later and attempt to complete initial assessment     Manual Melody, RN, CRM      Reason for Admission:  confusion                   RUR Score:   n/a                Plan for utilizing home health:  OT recommending OP therapy     PCP: First and Last name:  Linette Parnell MD     Name of Practice:    Are you a current patient: Yes/No:    Approximate date of last visit:    Can you participate in a virtual visit with your PCP:                     Current Advanced Directive/Advance Care Plan: Full Code    Healthcare Decision Maker:   Click here to complete [address] including selection of the Healthcare Decision Maker Relationship (ie \"Primary\")  : Bartolome Nunes [phone]                         Transition of Care Plan:  Likely home when medically stable    Care Management Interventions  PCP Verified by CM: No  Mode of Transport at Discharge:  Other (see comment) (family)  Physical Therapy Consult: Yes  Occupational Therapy Consult: Yes  Support Systems: Spouse/Significant Other  Confirm Follow Up Transport: Family  Discharge Location  Patient Expects to be Discharged to[de-identified] Home

## 2022-08-30 ENCOUNTER — APPOINTMENT (OUTPATIENT)
Dept: GENERAL RADIOLOGY | Age: 47
DRG: 072 | End: 2022-08-30
Attending: PSYCHIATRY & NEUROLOGY
Payer: COMMERCIAL

## 2022-08-30 PROBLEM — I77.9 CAROTID ARTERY DISEASE (HCC): Status: ACTIVE | Noted: 2022-08-30

## 2022-08-30 PROBLEM — R41.82 ALTERED MENTAL STATUS: Status: ACTIVE | Noted: 2022-08-30

## 2022-08-30 PROBLEM — I72.9 PSEUDOANEURYSM (HCC): Status: ACTIVE | Noted: 2022-08-30

## 2022-08-30 LAB
ALBUMIN SERPL-MCNC: 3.3 G/DL (ref 3.5–5)
ANION GAP SERPL CALC-SCNC: 2 MMOL/L (ref 5–15)
BASOPHILS # BLD: 0.1 K/UL (ref 0–0.1)
BASOPHILS NFR BLD: 1 % (ref 0–1)
BUN SERPL-MCNC: 10 MG/DL (ref 6–20)
BUN/CREAT SERPL: 17 (ref 12–20)
CALCIUM SERPL-MCNC: 8.9 MG/DL (ref 8.5–10.1)
CHLORIDE SERPL-SCNC: 108 MMOL/L (ref 97–108)
CHOLEST SERPL-MCNC: 194 MG/DL
CO2 SERPL-SCNC: 29 MMOL/L (ref 21–32)
CREAT SERPL-MCNC: 0.58 MG/DL (ref 0.55–1.02)
DIFFERENTIAL METHOD BLD: ABNORMAL
EOSINOPHIL # BLD: 0.1 K/UL (ref 0–0.4)
EOSINOPHIL NFR BLD: 1 % (ref 0–7)
ERYTHROCYTE [DISTWIDTH] IN BLOOD BY AUTOMATED COUNT: 11.7 % (ref 11.5–14.5)
GLUCOSE SERPL-MCNC: 79 MG/DL (ref 65–100)
HCT VFR BLD AUTO: 40.1 % (ref 35–47)
HDLC SERPL-MCNC: 68 MG/DL
HDLC SERPL: 2.9 {RATIO} (ref 0–5)
HGB BLD-MCNC: 13.9 G/DL (ref 11.5–16)
IMM GRANULOCYTES # BLD AUTO: 0 K/UL (ref 0–0.04)
IMM GRANULOCYTES NFR BLD AUTO: 0 % (ref 0–0.5)
LDLC SERPL CALC-MCNC: 100.4 MG/DL (ref 0–100)
LYMPHOCYTES # BLD: 3.3 K/UL (ref 0.8–3.5)
LYMPHOCYTES NFR BLD: 31 % (ref 12–49)
MCH RBC QN AUTO: 33.7 PG (ref 26–34)
MCHC RBC AUTO-ENTMCNC: 34.7 G/DL (ref 30–36.5)
MCV RBC AUTO: 97.1 FL (ref 80–99)
MONOCYTES # BLD: 1.2 K/UL (ref 0–1)
MONOCYTES NFR BLD: 11 % (ref 5–13)
NEUTS SEG # BLD: 5.7 K/UL (ref 1.8–8)
NEUTS SEG NFR BLD: 56 % (ref 32–75)
NRBC # BLD: 0 K/UL (ref 0–0.01)
NRBC BLD-RTO: 0 PER 100 WBC
PHOSPHATE SERPL-MCNC: 2.9 MG/DL (ref 2.6–4.7)
PLATELET # BLD AUTO: 230 K/UL (ref 150–400)
PMV BLD AUTO: 9.9 FL (ref 8.9–12.9)
POTASSIUM SERPL-SCNC: 4.2 MMOL/L (ref 3.5–5.1)
RBC # BLD AUTO: 4.13 M/UL (ref 3.8–5.2)
SODIUM SERPL-SCNC: 139 MMOL/L (ref 136–145)
TRIGL SERPL-MCNC: 128 MG/DL (ref ?–150)
VLDLC SERPL CALC-MCNC: 25.6 MG/DL
WBC # BLD AUTO: 10.4 K/UL (ref 3.6–11)

## 2022-08-30 PROCEDURE — 85025 COMPLETE CBC W/AUTO DIFF WBC: CPT

## 2022-08-30 PROCEDURE — 95816 EEG AWAKE AND DROWSY: CPT | Performed by: PSYCHIATRY & NEUROLOGY

## 2022-08-30 PROCEDURE — 65270000046 HC RM TELEMETRY

## 2022-08-30 PROCEDURE — 74011250637 HC RX REV CODE- 250/637: Performed by: INTERNAL MEDICINE

## 2022-08-30 PROCEDURE — 99232 SBSQ HOSP IP/OBS MODERATE 35: CPT | Performed by: NURSE PRACTITIONER

## 2022-08-30 PROCEDURE — 36415 COLL VENOUS BLD VENIPUNCTURE: CPT

## 2022-08-30 PROCEDURE — 80061 LIPID PANEL: CPT

## 2022-08-30 PROCEDURE — 74011250637 HC RX REV CODE- 250/637: Performed by: HOSPITALIST

## 2022-08-30 PROCEDURE — 80069 RENAL FUNCTION PANEL: CPT

## 2022-08-30 PROCEDURE — G0378 HOSPITAL OBSERVATION PER HR: HCPCS

## 2022-08-30 PROCEDURE — 74011636637 HC RX REV CODE- 636/637: Performed by: HOSPITALIST

## 2022-08-30 RX ORDER — (CALCIUM, MAGNESIUM, POTASSIUM, AND SODIUM OXYBATES) .5; .5; .5; .5 G/ML; G/ML; G/ML; G/ML
3500 SOLUTION ORAL EVERY EVENING
COMMUNITY

## 2022-08-30 RX ORDER — BACLOFEN 10 MG/1
10 TABLET ORAL 2 TIMES DAILY
Status: DISCONTINUED | OUTPATIENT
Start: 2022-08-30 | End: 2022-09-03 | Stop reason: HOSPADM

## 2022-08-30 RX ADMIN — ATENOLOL 25 MG: 25 TABLET ORAL at 09:12

## 2022-08-30 RX ADMIN — PRAVASTATIN SODIUM 10 MG: 10 TABLET ORAL at 20:50

## 2022-08-30 RX ADMIN — Medication: at 09:14

## 2022-08-30 RX ADMIN — BACLOFEN 10 MG: 10 TABLET ORAL at 17:31

## 2022-08-30 RX ADMIN — BUPROPION HYDROCHLORIDE 300 MG: 300 TABLET ORAL at 20:52

## 2022-08-30 RX ADMIN — LEVOTHYROXINE SODIUM 175 MCG: 0.15 TABLET ORAL at 06:27

## 2022-08-30 RX ADMIN — ASPIRIN 81 MG CHEWABLE TABLET 81 MG: 81 TABLET CHEWABLE at 09:11

## 2022-08-30 RX ADMIN — SERTRALINE 200 MG: 50 TABLET, FILM COATED ORAL at 20:49

## 2022-08-30 RX ADMIN — BACLOFEN 10 MG: 10 TABLET ORAL at 13:11

## 2022-08-30 RX ADMIN — LAMOTRIGINE 25 MG: 25 TABLET ORAL at 09:11

## 2022-08-30 RX ADMIN — ATENOLOL 25 MG: 25 TABLET ORAL at 20:50

## 2022-08-30 RX ADMIN — PREDNISONE 10 MG: 10 TABLET ORAL at 09:11

## 2022-08-30 RX ADMIN — LAMOTRIGINE 200 MG: 100 TABLET ORAL at 20:49

## 2022-08-30 RX ADMIN — MONTELUKAST 10 MG: 10 TABLET, FILM COATED ORAL at 20:50

## 2022-08-30 RX ADMIN — LEFLUNOMIDE 20 MG: 10 TABLET ORAL at 09:11

## 2022-08-30 NOTE — PROCEDURES
ELECTROENCEPHALOGRAM REPORT     Patient Name: Anatoliy Holguin  : 1975  Age: 42 y.o. Date of EEG: 2022  Interpreting physician: Lupe Dueñas MD      PROCEDURE: Routine awake and drowsy video electroencephalogram (vEEG). CLINICAL INDICATION: The patient is a 42 y.o. female who is being evaluated for subacute onset of persisting confusional state. DESCRIPTION OF THE RECORD:   During wakefulness, there is a well-formed posterior dominant alpha rhythm composed of 10 to 11 Hz alpha with preserved anterior posterior frequency/amplitude gradient. Transition to drowsiness manifest by fragmentation of waking background with progressive increase in diffuse theta. No discrete sleep architecture is noted. No epileptiform discharges, electrographic seizures or lateralized patterns appreciated. Reactivity appears present to environmental stimuli, photic stimulation was not performed due to equipment malfunction. Single-lead ECG demonstrates sinus rhythm. INTERPRETATION:     This is a normal awake and drowsy routine vEEG.     Cassandra Jose MD

## 2022-08-30 NOTE — PROGRESS NOTES
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 Rapid EEG was performed on admission and without epileptiform activity. Lower suspicion for seizure activity. MRI/A head and neck did not reveal acute intracranial pathology and there was noted L cervical ICA flow gap and R cervical ICA chronic dissection/pseudoaneurysm. Plan:   - MRI with contrast showed no intracranial mass or enhancing lesion.  - LP pending for tomorrow to assess for autoimmune/inflammatory encephalitis with IR. Lovenox was given last pm and I discontinued the dose for tonight.  - Repeat EEG yesterday negative for seizure activity.       Objective:   Chart reviewed since last seen    Current Facility-Administered Medications   Medication Dose Route Frequency    baclofen (LIORESAL) tablet 10 mg  10 mg Oral BID    ondansetron (ZOFRAN) injection 4 mg  4 mg IntraVENous Q6H PRN    lactulose (CHRONULAC) 10 gram/15 mL solution 15 mL  10 g Oral DAILY PRN    buPROPion XL (WELLBUTRIN XL) tablet 300 mg- Patient's own med (Patient Supplied)  300 mg Oral QHS    acetaminophen (TYLENOL) tablet 650 mg  650 mg Oral Q4H PRN    Or    acetaminophen (TYLENOL) solution 650 mg  650 mg Per NG tube Q4H PRN    Or    acetaminophen (TYLENOL) suppository 650 mg  650 mg Rectal Q4H PRN    aspirin chewable tablet 81 mg  81 mg Oral DAILY    atenoloL (TENORMIN) tablet 25 mg  25 mg Oral Q12H    fexofenadine (ALLEGRA) 180 mg tablet- Patient own med (Patient Supplied)   Oral DAILY    lamoTRIgine (LaMICtal) tablet 200 mg  200 mg Oral QHS    lamoTRIgine (LaMICtal) tablet 25 mg  25 mg Oral DAILY    leflunomide (ARAVA) tablet 20 mg  20 mg Oral DAILY    levothyroxine (SYNTHROID) tablet 175 mcg  175 mcg Oral Once per day on Mon Tue Thu Fri Sat    [START ON 2022] levothyroxine (SYNTHROID) tablet 262.5 mcg  262.5 mcg Oral every Wednesday    montelukast (SINGULAIR) tablet 10 mg  10 mg Oral QHS    pravastatin (PRAVACHOL) tablet 10 mg  10 mg Oral QHS    predniSONE (DELTASONE) tablet 10 mg  10 mg Oral DAILY    sertraline (ZOLOFT) tablet 200 mg  200 mg Oral QHS    Lisdexamfetamine (VYVANSE) capsule 70 mg- Patient's own med (Patient Supplied)  70 mg Oral DAILY       Visit Vitals  /75 (BP 1 Location: Right upper arm, BP Patient Position: Sitting)   Pulse 79   Temp 98.1 °F (36.7 °C)   Resp 23   Ht 5' 7\" (1.702 m)   Wt 81.9 kg (180 lb 9.6 oz)   SpO2 99%   BMI 28.29 kg/m²     Temp (24hrs), Av.1 °F (36.7 °C), Min:98 °F (36.7 °C), Max:98.1 °F (36.7 °C)      No intake/output data recorded.  1901 -  0700  In: 240 [P.O.:240]  Out: 700 [Urine:700]      Physical Exam:  General: Well developed well nourished patient in no apparent distress. Cardiac: Regular rate and rhythm with no murmurs. Extremities: 2+ Radial pulses, no cyanosis or edema    Neurological Exam:  Mental Status: Oriented to time, place and person. Some word-finding difficulty and delayed responses. Cranial Nerves:   VFF, PERRL, EOMI, no nystagmus, no ptosis. Facial sensation is normal. Facial movement is symmetric. Palate is midline. Tongue is midline. Hearing is intact bilaterally.    Motor:  5/5 strength in upper and lower proximal and distal muscles. Normal bulk and tone. No PD. No tremors   Reflexes:   Deep tendon reflexes 2+ and symmetric. Toes downgoing. Sensory:   SILT bilaterally throughout   Gait:  Not assessed. Cerebellar:  Intact FTN          Lab Review   Recent Results (from the past 24 hour(s))   CBC WITH AUTOMATED DIFF    Collection Time: 08/30/22  4:24 AM   Result Value Ref Range    WBC 10.4 3.6 - 11.0 K/uL    RBC 4.13 3.80 - 5.20 M/uL    HGB 13.9 11.5 - 16.0 g/dL    HCT 40.1 35.0 - 47.0 %    MCV 97.1 80.0 - 99.0 FL    MCH 33.7 26.0 - 34.0 PG    MCHC 34.7 30.0 - 36.5 g/dL    RDW 11.7 11.5 - 14.5 %    PLATELET 464 494 - 789 K/uL    MPV 9.9 8.9 - 12.9 FL    NRBC 0.0 0  WBC    ABSOLUTE NRBC 0.00 0.00 - 0.01 K/uL    NEUTROPHILS 56 32 - 75 %    LYMPHOCYTES 31 12 - 49 %    MONOCYTES 11 5 - 13 %    EOSINOPHILS 1 0 - 7 %    BASOPHILS 1 0 - 1 %    IMMATURE GRANULOCYTES 0 0.0 - 0.5 %    ABS. NEUTROPHILS 5.7 1.8 - 8.0 K/UL    ABS. LYMPHOCYTES 3.3 0.8 - 3.5 K/UL    ABS. MONOCYTES 1.2 (H) 0.0 - 1.0 K/UL    ABS. EOSINOPHILS 0.1 0.0 - 0.4 K/UL    ABS. BASOPHILS 0.1 0.0 - 0.1 K/UL    ABS. IMM.  GRANS. 0.0 0.00 - 0.04 K/UL    DF AUTOMATED     RENAL FUNCTION PANEL    Collection Time: 08/30/22  4:24 AM   Result Value Ref Range    Sodium 139 136 - 145 mmol/L    Potassium 4.2 3.5 - 5.1 mmol/L    Chloride 108 97 - 108 mmol/L    CO2 29 21 - 32 mmol/L    Anion gap 2 (L) 5 - 15 mmol/L    Glucose 79 65 - 100 mg/dL    BUN 10 6 - 20 MG/DL    Creatinine 0.58 0.55 - 1.02 MG/DL    BUN/Creatinine ratio 17 12 - 20      GFR est AA >60 >60 ml/min/1.73m2    GFR est non-AA >60 >60 ml/min/1.73m2    Calcium 8.9 8.5 - 10.1 MG/DL    Phosphorus 2.9 2.6 - 4.7 MG/DL    Albumin 3.3 (L) 3.5 - 5.0 g/dL   LIPID PANEL    Collection Time: 08/30/22  4:24 AM   Result Value Ref Range    Cholesterol, total 194 <200 MG/DL    Triglyceride 128 <150 MG/DL    HDL Cholesterol 68 MG/DL    LDL, calculated 100.4 (H) 0 - 100 MG/DL    VLDL, calculated 25.6 MG/DL    CHOL/HDL Ratio 2.9 0.0 - 5.0         Additional comments:  I have reviewed the patient's new clinical lab test results. I have personally reviewed the patient's radiographs. MRI Results (most recent):  [facility]    MRI BRAIN W CONT    Narrative  EXAM: MRI BRAIN W CONT    INDICATION: headaches, AMS. COMPARISON: 8/28/2020 brain MRI. CONTRAST: 18 mL of ProHance. TECHNIQUE:  Limited sequence T1-weighted multiplanar acquisition of the brain before and  after IV contrast administration to complement prior study performed one day  prior dated 8/28/2022. FINDINGS:  The ventricles are normal in size and position. There is no cerebellar  tonsillar herniation. Basal CSF cisterns are patent. No intracranial mass or  enhancing lesion. Dural venous sinuses are patent without filling defects suggest chondrosis. No intraorbital enhancing lesion or mass. Mild diffuse paranasal sinus soft  tissue thickening/enhancement. No significant osseous or scalp lesions are  identified. Impression  No intracranial mass or enhancing lesion.       JORDAN Duke

## 2022-08-30 NOTE — CONSULTS
PSYCHIATRY CONSULT NOTE    REASON FOR CONSULT:hx of chronic mental health. INTERVAL HISTORY:  8/30/22: Patient is observed sitting up in bed and eating breakfast. She describes her mood as \"good\" but further states that she is upset about a lot of things. She was mildly tangential and explains her frustration over the fact that she is confused and forgetful. She reports she forgot to take her 6 am medications this morning when the nurse brought it in. She is also upset about the fact that she read her documentation notes from [facility] and it didn't reflect the symptoms she presented with at the time. 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 She was reassured that she does not meet criteria for a psych admission at this time. She denies current SI/HI/AVH. She denies concerns with her sleep and appetite. HISTORY OF PRESENTING COMPLAINT:  Ayaan Starr is a 42 y.o. WHITE/NON- female who is currently admitted to the medical floor at [facility]. Patient presented to the ED due to intermittent confusion and inability to perform tasks and not recognizing known places. Upon assessment today, patient was initially upset that psych was consulted without her knowledge but she still agreed to participate in the assessment. She was calm and cooperative. She denies current depression but appears to be anxious. She reported having periods of intermittent confusion, intense headaches and cognitive slurring due to stress. She reports feeling worried and frustrated. Patient denies current suicidal/homicidal thoughts and AV hallucination.  She reports experiencing \"auditory illusion\" one time when she was in her room, the water fountain was running and the bipap machine was on and she thought she heard people talking but when she turned off the equipments, she could no longer here voices. She also narrates a period where she got paranoid because she thought her colleagues  were talking about her. No paranoia or delusions were observed or reported during this assessment. She reports that she gets very sensitive to noise. She also reports that she has been sleeping too much, denied appetite concerns. PAST PSYCHIATRIC HISTORY:  No hx of psych admission  She reported she just started seeing a psychiatrist (unable to recall the name). She reported she takes Zoloft, Lamictal and Wellbutrin. However she stopped taking the Wellbutrin on Friday due to concerns of seizures. Denies hx of suicide attempt    SUBSTANCE ABUSE HISTORY: Reports she drinks wine occasionally but denies any other drug use like cocaine marijuana. PAST MEDICAL HISTORY:    Please see H&P for details. Past Medical History:   Diagnosis Date    Asthma     Cancer ([facility] [address]     thyroid papillary    Depression     HTN (hypertension)     Inflammatory arthritis      Prior to Admission medications    Medication Sig Start Date End Date Taking? Authorizing Provider   cholecalciferol (VITAMIN D3) (5000 Units/125 mcg) tab tablet Take 5,000 Units by mouth five (5) days a week. Yes Provider, Historical   lamoTRIgine (LaMICtal) 25 mg tablet Take 25 mg by mouth daily. (25 mg in the morning; 200 mg in the evening)   Yes Provider, Historical   levothyroxine (SYNTHROID) 175 mcg tablet Take 175 mcg by mouth five (5) days a week. (175 mcg x 5 days; 350 mcg on Sunday; 262.5 mcg on Wednesday)   Yes Provider, Historical   levothyroxine (SYNTHROID) 175 mcg tablet Take 350 mcg by mouth every Sunday. (175 mcg x 5 days; 350 mcg on Sunday; 262.5 mcg on Wednesday)   Yes Provider, Historical   levothyroxine (SYNTHROID) 175 mcg tablet Take 262.5 mcg by mouth every Wednesday.  (175 mcg x 5 days; 350 mcg on Sunday; 262.5 mcg on Wednesday)   Yes Provider, Historical   acetaminophen (TYLENOL) 500 mg tablet Take 1,000 mg by mouth four (4) times daily as needed for Pain (headache). Yes Provider, Historical   therapeutic multivitamin (Thera) tablet Take 1 Tablet by mouth daily. Yes Provider, Historical   atenoloL (TENORMIN) 25 mg tablet Take 25 mg by mouth every twelve (12) hours. Yes Provider, Historical   baclofen (LIORESAL) 10 mg tablet Take 10 mg by mouth every twelve (12) hours. Yes Provider, Historical   leflunomide (ARAVA) 20 mg tablet Take 20 mg by mouth daily. Yes Provider, Historical   montelukast (SINGULAIR) 10 mg tablet Take 10 mg by mouth nightly. Yes Provider, Historical   pravastatin (PRAVACHOL) 10 mg tablet Take 10 mg by mouth nightly. Yes Provider, Historical   predniSONE (DELTASONE) 5 mg tablet Take 10 mg by mouth daily. Yes Provider, Historical   promethazine (PHENERGAN) 12.5 mg tablet Take 12.5 mg by mouth every six (6) hours as needed for Nausea. Yes Provider, Historical   semaglutide (Ozempic) 1 mg/dose (2 mg/1.5 mL) sub-q pen 0.25 mg by SubCUTAneous route every other day. Yes Provider, Historical   sodium,calcium,mag,pot oxybate Kirsten Dulce Maria) 0.5 gram/mL soln Take 2,750 mg by mouth nightly. (dose = 2750 mg = 5.5 mL)   Yes Provider, Historical   CAFFEINE PO Take 1,000 mg by mouth four (4) times daily as needed. ([employer])   Yes Provider, Historical   levalbuterol tartrate (XOPENEX) 45 mcg/actuation inhaler Take 2 Puffs by inhalation every four (4) hours as needed for Wheezing or Shortness of Breath. Yes Provider, Historical   Lisdexamfetamine (VYVANSE) 70 mg cap Take 70 mg by mouth daily. Yes Provider, Historical   lamoTRIgine (LaMICtal) 100 mg tablet Take 200 mg by mouth nightly. (25 mg in the morning; 200 mg in the evening)   Yes Provider, Historical   sertraline (ZOLOFT) 100 mg tablet Take 200 mg by mouth nightly. Yes Provider, Historical   buPROPion XL (WELLBUTRIN XL) 300 mg XL tablet Take 300 mg by mouth nightly.    Yes Provider, Historical   aspirin delayed-release 81 mg tablet Take 81 mg by mouth daily. Indications: carotid artery disection with stent 3/26/16  Yes Provider, Historical   adalimumab (HUMRIA) 40 mg/0.8 mL injection 40 mg by SubCUTAneous route every Tuesday. Yes Provider, Historical   fexofenadine (ALLEGRA) 180 mg tablet Take 180 mg by mouth every twelve (12) hours. Yes Provider, Historical     Vitals:    08/30/22 0600 08/30/22 0912 08/30/22 0958 08/30/22 1000   BP:  (!) 144/84 136/80    Pulse: (!) 59 69 67 71   Resp:   14    Temp:   98 °F (36.7 °C)    SpO2:   99%    Weight:       Height:         Lab Results   Component Value Date/Time    WBC 10.4 08/30/2022 04:24 AM    HGB 13.9 08/30/2022 04:24 AM    HCT 40.1 08/30/2022 04:24 AM    PLATELET 441 52/76/4631 04:24 AM    MCV 97.1 08/30/2022 04:24 AM     Lab Results   Component Value Date/Time    Sodium 139 08/30/2022 04:24 AM    Potassium 4.2 08/30/2022 04:24 AM    Chloride 108 08/30/2022 04:24 AM    CO2 29 08/30/2022 04:24 AM    Anion gap 2 (L) 08/30/2022 04:24 AM    Glucose 79 08/30/2022 04:24 AM    BUN 10 08/30/2022 04:24 AM    Creatinine 0.58 08/30/2022 04:24 AM    BUN/Creatinine ratio 17 08/30/2022 04:24 AM    GFR est AA >60 08/30/2022 04:24 AM    GFR est non-AA >60 08/30/2022 04:24 AM    Calcium 8.9 08/30/2022 04:24 AM    Bilirubin, total 0.3 08/28/2022 06:20 PM    Alk. phosphatase 73 08/28/2022 06:20 PM    Protein, total 7.1 08/28/2022 06:20 PM    Albumin 3.3 (L) 08/30/2022 04:24 AM    Globulin 3.4 08/28/2022 06:20 PM    A-G Ratio 1.1 08/28/2022 06:20 PM    ALT (SGPT) 29 08/28/2022 06:20 PM    AST (SGOT) 18 08/28/2022 06:20 PM     No results found for: VALF2, VALAC, VALP, VALPR, DS6, CRBAM, CRBAMP, CARB2, XCRBAM  No results found for: LITHM  RADIOLOGY REPORTS:(reviewed/updated 8/30/2022)  MRA BRAIN WO CONT    Result Date: 8/28/2022  INDICATION: Confusion COMPARISON:  None TECHNIQUE:  3-D time-of-flight MRA of the brain was performed. 2-D time-of-flight MRA of the neck was performed.  Multiplanar reconstructions were obtained. FINDINGS: MRA NECK Common Carotids/Internal Carotids (visualized segments): There is a short segment flow gap in the proximal left cervical internal carotid artery segment and slight reduction in caliber distally. There is 9 mm pseudoaneurysm along the ventral margin of the distal right cervical internal carotid artery segment Vertebral Arteries (visualized segments):  No flow limiting stenosis. MRA HEAD Posterior Circulation:  No flow limiting stenosis. Anterior Circulation:  No flow limiting stenosis. Other:  No aneurysm or vascular malformation. 1. Short segment flow gap proximal left cervical internal carotid artery segment could be due to occlusion or high-grade stenosis with patency distally. 2. Focal irregularity and pseudoaneurysm distal right cervical internal carotid artery segment consistent with chronic dissection/pseudoaneurysm. Recommend CTA head and neck. MRA NECK WO CONT    Result Date: 8/28/2022  INDICATION: Confusion COMPARISON:  None TECHNIQUE:  3-D time-of-flight MRA of the brain was performed. 2-D time-of-flight MRA of the neck was performed. Multiplanar reconstructions were obtained. FINDINGS: MRA NECK Common Carotids/Internal Carotids (visualized segments): There is a short segment flow gap in the proximal left cervical internal carotid artery segment and slight reduction in caliber distally. There is 9 mm pseudoaneurysm along the ventral margin of the distal right cervical internal carotid artery segment Vertebral Arteries (visualized segments):  No flow limiting stenosis. MRA HEAD Posterior Circulation:  No flow limiting stenosis. Anterior Circulation:  No flow limiting stenosis. Other:  No aneurysm or vascular malformation. 1. Short segment flow gap proximal left cervical internal carotid artery segment could be due to occlusion or high-grade stenosis with patency distally.  2. Focal irregularity and pseudoaneurysm distal right cervical internal carotid artery segment consistent with chronic dissection/pseudoaneurysm. Recommend CTA head and neck. MRI BRAIN WO CONT    Result Date: 8/28/2022  INDICATION:   confusion EXAMINATION:  MRI BRAIN WO CONTRAST COMPARISON:  None TECHNIQUE:  Multiplanar multisequence acquisition without contrast of the brain. FINDINGS:  Ventricles:  Midline, no hydrocephalus. Brain Parenchyma/Brainstem:  Normal for age. No acute infarction. Intracranial Hemorrhage:  None. Basal Cisterns:  Normal. Flow Voids:  Normal. Additional Comments:  N/A. No significant abnormality or acute process. No results found for: Ryan Torres [id number], [id number], [id number], PREGU, POCHCG, MHCGN, HCGQR, THCGA1, SHCG, HCGN, HCGSERUM, HCGURQLPOC    PSYCHOSOCIAL HISTORY: Patient reports she is , has no children and she is a nurse practitioner. MENTAL STATUS EXAM:    General appearance: moderately groomed, psychomotor activity is wnl  Eye contact: fair eye contact  Speech: Spontaneous, soft, decreased output. Affect : Depressed, decreased range  Mood: \"good\"  Thought Process: Logical  Perception: Denies AH or VH. Thought Content: denies SI/HI or Plan  Insight: Partial  Judgement: Fair  Cognition: Intact grossly. ASSESSMENT AND PLAN:  Anatoliy Holguin meets criteria for a diagnosis of  Major depression and anxiety disorder by history. Will continue with current medications as prescribed. Patient appears to be stable and is not at risk to harm self or others at this time. Psych admission is not indicated and patient can be discharged when medically cleared. Thank your your consult. Please feel free to consult us again as needed.

## 2022-08-30 NOTE — BSMART NOTE
BSMART Liaison Team Note     LOS:  2     Patient goal(s) for today: take medications as prescribed, make needs known in an appropriate manner, utilize coping skills  BSMART Liaison team focus/goals: assess needs, provide support    Progress note: Patient assessed face to face. She reports feeling better today and noticing she is closer to baseline than on admission. She asked questions about the term thought blocking and was able to see how she struggled with this but that she is having very little issues with this now. She was concerned this was related to a psych diagnosis but was reassured it is a symptoms but does not necessarily mean that a psych diagnosis is the issue. Discussed possible reasons for this including a medical reason. Patient reports that she is frustrated after reading her online chart from [facility] and feeling like no one took her seriously and listened to her concerns. She reports feeling like staff here is listening and trying to help her. She did vent her concern about not remembering her nurse this morning coming in and giving her medication and found the medications at a later time still in the cup. She was upset with not remembering the event but with support and processing she was better able to see this may have been due to her not waking up fully or falling back asleep and not remembering. As she discussed she started to remember some of the event and felt less anxious and less upset with herself. She reports no concerns with SI, HI, or hallucinations. She denies any issues with sleep or appetite. She reports feeling heard after meeting with patient advocate and completed her virtual therapist appointment with her outpatient provider. Patient reports that she is hoping to feel better and get back to her life as she has been on medical leave and not able to get back to her life.      Barriers to Discharge:  medical treatment  Guns in the home: unknown      Outpatient provider(s): therapist  Insurance info/prescription coverage:  [employer]     Diagnosis:        Past Medical History:   Diagnosis Date    Asthma      Cancer ([facility] [address]       thyroid papillary    Depression      HTN (hypertension)      Inflammatory arthritis           Plan:  Defer to medical team. Per psych NP, patient does not currently meet inpatient psychiatric criteria  Follow up Psych Consult placed? no.   Psychiatrist updated? no         Participating treatment team members: Chin Riley, [address], Mili Maurice NP

## 2022-08-30 NOTE — PROGRESS NOTES
[address] [facility] Adult  Hospitalist Group                                                                                          Hospitalist Progress Note  Sheila Hernandez MD  Answering service: [phone] OR [phone] from in house phone        Date of Service:  2022  NAME:  Ubaldo Cranker  :  1975  MRN:  [id number]      Admission Summary:   HPI: Marta Caldwell is a 42 y.o. 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 Pt arrives for intermittent confusion, periods of inability to perform known tasks and not recognizing known places. These symptoms have been persistent x 2 weeks, she was seen at [facility] ED on Friday, had neuro workup and eval to include CTA head and neck and neurologist eval and dc'd with plan to follow up this week with neuro but now has concern for seizure activity due to presenting symptoms. Pt appears very anxious and has scattered thought processes with difficulty explaining symptoms. A&OX4, GCS 15. Denies any recent trauma or injuries.  .  Telemetry neurology eval would recommend admission for possible focal seizures, also recommend a brain MRI, MRA\"       Interval history / Subjective:   Pt states she had asked about getting LP for further evaluation, refer to neurology note     Assessment & Plan:     History of AMS/chronic confusion  -pt reports long history of memory lapses, headaches, etc which she gets all her care at [facility] and wishes to get her care at [facility] now.  -mri/mra noted  -pending eeg  -neurology consulted    History of mental mariana/depression/anxiety  -pt states she has narcolepsy as well, on stimulants  -does not voice si/hi/hallucinations  -wellbutrin held on admission and was resumed her other home meds    Hypothyroidism  -on replacement tx    Hypertension  -asymptomatic  -monitor/trend  -adjust meds as needed    See other orders as plan     Code status: full  Prophylaxis: lovenox  Care Plan discussed with: pt  Anticipated Disposition: tbd, pending tests    Ordered psych consult     Hospital Problems  Date Reviewed: 3/1/2021            Codes Class Noted POA    AMS (altered mental status) ICD-10-CM: R41.82  ICD-9-CM: 780.97  8/28/2022 Unknown           Review of Systems:   A comprehensive review of systems was negative except for that written in the HPI. Vital Signs:    Last 24hrs VS reviewed since prior progress note. Most recent are:  Visit Vitals  BP (!) 144/84   Pulse 69   Temp 98.1 °F (36.7 °C)   Resp 20   Ht 5' 7\" (1.702 m)   Wt 81.9 kg (180 lb 9.6 oz)   SpO2 98%   BMI 28.29 kg/m²         Intake/Output Summary (Last 24 hours) at 8/30/2022 2796  Last data filed at 8/30/2022 0031  Gross per 24 hour   Intake 240 ml   Output 700 ml   Net -460 ml          Physical Examination:     I had a face to face encounter with this patient and independently examined them on 8/30/2022 as outlined below:          Constitutional:  No acute distress, cooperative, pleasant    ENT:  Oral mucosa moist, oropharynx benign. Resp:  CTA bilaterally. No wheezing/rhonchi/rales. No accessory muscle use. CV:  Regular rhythm, normal rate, no rubs    GI:  Soft, non distended, non tender. normoactive bowel sounds, no hepatosplenomegaly     Musculoskeletal:  No edema, warm, 2+ pulses throughout    Neurologic:  Moves all extremities.   AAOx3,  pt mentions memory lapses       Psych: calm, not agitated, not anxious     Data Review:    Review and/or order of clinical lab test      Labs:     Recent Labs     08/30/22  0424 08/28/22  1820   WBC 10.4 11.7*   HGB 13.9 14.5   HCT 40.1 41.5    224       Recent Labs     08/30/22  0424 08/28/22  1820    137   K 4.2 4.1    106   CO2 29 30   BUN 10 11   CREA 0.58 0.70   GLU 79 89   CA 8.9 9.1   PHOS 2.9  -- Recent Labs     08/30/22  0424 08/28/22  1820   ALT  --  29   AP  --  73   TBILI  --  0.3   TP  --  7.1   ALB 3.3* 3.7   GLOB  --  3.4         Lab Results   Component Value Date/Time    Color YELLOW/STRAW 08/28/2022 06:20 PM    Appearance CLEAR 08/28/2022 06:20 PM    Specific gravity 1.011 08/28/2022 06:20 PM    pH (UA) 6.5 08/28/2022 06:20 PM    Protein Negative 08/28/2022 06:20 PM    Glucose Negative 08/28/2022 06:20 PM    Ketone Negative 08/28/2022 06:20 PM    Bilirubin Negative 08/28/2022 06:20 PM    Urobilinogen 0.2 08/28/2022 06:20 PM    Nitrites Negative 08/28/2022 06:20 PM    Leukocyte Esterase Negative 08/28/2022 06:20 PM         Medications Reviewed:     Current Facility-Administered Medications   Medication Dose Route Frequency    ondansetron (ZOFRAN) injection 4 mg  4 mg IntraVENous Q6H PRN    lactulose (CHRONULAC) 10 gram/15 mL solution 15 mL  10 g Oral DAILY PRN    buPROPion XL (WELLBUTRIN XL) tablet 300 mg- Patient's own med (Patient Supplied)  300 mg Oral QHS    acetaminophen (TYLENOL) tablet 650 mg  650 mg Oral Q4H PRN    Or    acetaminophen (TYLENOL) solution 650 mg  650 mg Per NG tube Q4H PRN    Or    acetaminophen (TYLENOL) suppository 650 mg  650 mg Rectal Q4H PRN    aspirin chewable tablet 81 mg  81 mg Oral DAILY    enoxaparin (LOVENOX) injection 40 mg  40 mg SubCUTAneous Q24H    atenoloL (TENORMIN) tablet 25 mg  25 mg Oral Q12H    fexofenadine (ALLEGRA) 180 mg tablet- Patient own med (Patient Supplied)   Oral DAILY    lamoTRIgine (LaMICtal) tablet 200 mg  200 mg Oral QHS    lamoTRIgine (LaMICtal) tablet 25 mg  25 mg Oral DAILY    leflunomide (ARAVA) tablet 20 mg  20 mg Oral DAILY    levothyroxine (SYNTHROID) tablet 175 mcg  175 mcg Oral Once per day on Mon Tue Thu Fri Sat    [START ON 8/31/2022] levothyroxine (SYNTHROID) tablet 262.5 mcg  262.5 mcg Oral every Wednesday    montelukast (SINGULAIR) tablet 10 mg  10 mg Oral QHS    pravastatin (PRAVACHOL) tablet 10 mg  10 mg Oral QHS predniSONE (DELTASONE) tablet 10 mg  10 mg Oral DAILY    sertraline (ZOLOFT) tablet 200 mg  200 mg Oral QHS    Lisdexamfetamine (VYVANSE) capsule 70 mg- Patient's own med (Patient Supplied)  70 mg Oral DAILY    sodium,calcium,mag,pot oxybate 0.5 gram/mL soln 2,750 mg  2,750 mg Oral QHS     ______________________________________________________________________  EXPECTED LENGTH OF STAY: - - -  ACTUAL LENGTH OF STAY:          0                 Kirit Ferraro MD

## 2022-08-30 NOTE — PROGRESS NOTES
Transition of Care Plan: Likely home when medically stable     RUR: N/A     PCP F/U: Dr. Pam Mallory     Disposition: Likely home when medically stable     Transportation: family? Main Contact: : Daphne BROWN-[phone]    18: Met with patient at the bedside. Introduced self and role of CM. Patient was able to confirm demographic information with this CM today. Discussed discharge plans with patient. States she has been experiencing confusion since Friday-08/26. Patient states that she does not live with her  and has been renting out a room from an elderly couple. States she does have a virtual  who helps manage her schedule. Family do not live in the area and patient believes she has limited support. Is hopeful that  will provide transportation at discharge, but is unsure. Has some concerns about transportation. Would prefer a friend or family member to take patient home instead of roundtrip due to her c/o confusion. Does have a key to enter home. Would like personal caregiver list. States she has a long term care policy that she may be able to use. Discussed recommendation for OP therapy. Would like CM to research therapy locations near her and provide with rx for OP therapy. CM to provide prior to discharge.      Deisi Lind RN, CRM

## 2022-08-30 NOTE — PROGRESS NOTES
Problem: Falls - Risk of  Goal: *Absence of Falls  Description: Document Landin Reason Fall Risk and appropriate interventions in the flowsheet.   Outcome: Progressing Towards Goal  Note: Fall Risk Interventions:       Mentation Interventions: Adequate sleep, hydration, pain control, Door open when patient unattended, Evaluate medications/consider consulting pharmacy, Increase mobility, More frequent rounding, Reorient patient, Room close to nurse's station, Toileting rounds, Update white board    Medication Interventions: Assess postural VS orthostatic hypotension, Evaluate medications/consider consulting pharmacy, Teach patient to arise slowly, Utilize gait belt for transfers/ambulation, Patient to call before getting OOB                   Problem: Patient Education: Go to Patient Education Activity  Goal: Patient/Family Education  Outcome: Progressing Towards Goal     Problem: General Medical Care Plan  Goal: *Vital signs within specified parameters  Outcome: Progressing Towards Goal  Goal: *Labs within defined limits  Outcome: Progressing Towards Goal  Goal: *Absence of infection signs and symptoms  Outcome: Progressing Towards Goal  Goal: *Optimal pain control at patient's stated goal  Outcome: Progressing Towards Goal  Goal: *Skin integrity maintained  Outcome: Progressing Towards Goal  Goal: *Fluid volume balance  Outcome: Progressing Towards Goal  Goal: *Optimize nutritional status  Outcome: Progressing Towards Goal  Goal: *Anxiety reduced or absent  Outcome: Progressing Towards Goal  Goal: *Progressive mobility and function (eg: ADL's)  Outcome: Progressing Towards Goal     Problem: Patient Education: Go to Patient Education Activity  Goal: Patient/Family Education  Outcome: Progressing Towards Goal     Problem: Patient Education: Go to Patient Education Activity  Goal: Patient/Family Education  Outcome: Progressing Towards Goal     Problem: TIA/CVA Stroke: Day 2 Until Discharge  Goal: Activity/Safety  Outcome: Progressing Towards Goal  Goal: Diagnostic Test/Procedures  Outcome: Progressing Towards Goal  Goal: Nutrition/Diet  Outcome: Progressing Towards Goal  Goal: Discharge Planning  Outcome: Progressing Towards Goal  Goal: Medications  Outcome: Progressing Towards Goal  Goal: Respiratory  Outcome: Progressing Towards Goal  Goal: Treatments/Interventions/Procedures  Outcome: Progressing Towards Goal  Goal: Psychosocial  Outcome: Progressing Towards Goal  Goal: *Verbalizes anxiety and depression are reduced or absent  Outcome: Progressing Towards Goal  Goal: *Absence of aspiration  Outcome: Progressing Towards Goal  Goal: *Absence of deep venous thrombosis signs and symptoms(Stroke Metric)  Outcome: Progressing Towards Goal  Goal: *Optimal pain control at patient's stated goal  Outcome: Progressing Towards Goal  Goal: *Tolerating diet  Outcome: Progressing Towards Goal  Goal: *Ability to perform ADLs and demonstrates progressive mobility and function  Outcome: Progressing Towards Goal  Goal: *Stroke education continued(Stroke Metric)  Outcome: Progressing Towards Goal     Problem: Ischemic Stroke: Discharge Outcomes  Goal: *Verbalizes anxiety and depression are reduced or absent  Outcome: Progressing Towards Goal  Goal: *Verbalize understanding of risk factor modification(Stroke Metric)  Outcome: Progressing Towards Goal  Goal: *Hemodynamically stable  Outcome: Progressing Towards Goal  Goal: *Absence of aspiration pneumonia  Outcome: Progressing Towards Goal  Goal: *Aware of needed dietary changes  Outcome: Progressing Towards Goal  Goal: *Verbalize understanding of prescribed medications including anti-coagulants, anti-lipid, and/or anti-platelets(Stroke Metric)  Outcome: Progressing Towards Goal  Goal: *Tolerating diet  Outcome: Progressing Towards Goal  Goal: *Aware of follow-up diagnostics related to anticoagulants  Outcome: Progressing Towards Goal  Goal: *Ability to perform ADLs and demonstrates progressive mobility and function  Outcome: Progressing Towards Goal  Goal: *Absence of DVT(Stroke Metric)  Outcome: Progressing Towards Goal  Goal: *Absence of aspiration  Outcome: Progressing Towards Goal  Goal: *Optimal pain control at patient's stated goal  Outcome: Progressing Towards Goal  Goal: *Home safety concerns addressed  Outcome: Progressing Towards Goal  Goal: *Describes available resources and support systems  Outcome: Progressing Towards Goal  Goal: *Verbalizes understanding of activation of EMS(911) for stroke symptoms(Stroke Metric)  Outcome: Progressing Towards Goal  Goal: *Understands and describes signs and symptoms to report to providers(Stroke Metric)  Outcome: Progressing Towards Goal  Goal: *Neurolgocially stable (absence of additional neurological deficits)  Outcome: Progressing Towards Goal  Goal: *Verbalizes importance of follow-up with primary care physician(Stroke Metric)  Outcome: Progressing Towards Goal  Goal: *Smoking cessation discussed,if applicable(Stroke Metric)  Outcome: Progressing Towards Goal  Goal: *Depression screening completed(Stroke Metric)  Outcome: Progressing Towards Goal     Problem: Pressure Injury - Risk of  Goal: *Prevention of pressure injury  Description: Document John Scale and appropriate interventions in the flowsheet.   Outcome: Progressing Towards Goal     Problem: Patient Education: Go to Patient Education Activity  Goal: Patient/Family Education  Outcome: Progressing Towards Goal

## 2022-08-30 NOTE — PROGRESS NOTES
Problem: Falls - Risk of  Goal: *Absence of Falls  Description: Document Marina Lopez Fall Risk and appropriate interventions in the flowsheet.   Outcome: Progressing Towards Goal  Note: Fall Risk Interventions:       Mentation Interventions: Adequate sleep, hydration, pain control, Door open when patient unattended, Evaluate medications/consider consulting pharmacy, Increase mobility, Reorient patient, Room close to nurse's station, Toileting rounds, Update white board    Medication Interventions: Assess postural VS orthostatic hypotension, Evaluate medications/consider consulting pharmacy, Patient to call before getting OOB, Teach patient to arise slowly, Utilize gait belt for transfers/ambulation                   Problem: Patient Education: Go to Patient Education Activity  Goal: Patient/Family Education  Outcome: Progressing Towards Goal     Problem: General Medical Care Plan  Goal: *Vital signs within specified parameters  Outcome: Progressing Towards Goal  Goal: *Labs within defined limits  Outcome: Progressing Towards Goal  Goal: *Absence of infection signs and symptoms  Outcome: Progressing Towards Goal  Goal: *Optimal pain control at patient's stated goal  Outcome: Progressing Towards Goal  Goal: *Skin integrity maintained  Outcome: Progressing Towards Goal  Goal: *Fluid volume balance  Outcome: Progressing Towards Goal  Goal: *Optimize nutritional status  Outcome: Progressing Towards Goal  Goal: *Anxiety reduced or absent  Outcome: Progressing Towards Goal  Goal: *Progressive mobility and function (eg: ADL's)  Outcome: Progressing Towards Goal     Problem: Patient Education: Go to Patient Education Activity  Goal: Patient/Family Education  Outcome: Progressing Towards Goal     Problem: Patient Education: Go to Patient Education Activity  Goal: Patient/Family Education  Outcome: Progressing Towards Goal     Problem: TIA/CVA Stroke: Day 2 Until Discharge  Goal: Activity/Safety  Outcome: Progressing Towards Goal  Goal: Diagnostic Test/Procedures  Outcome: Progressing Towards Goal  Goal: Nutrition/Diet  Outcome: Progressing Towards Goal  Goal: Discharge Planning  Outcome: Progressing Towards Goal  Goal: Medications  Outcome: Progressing Towards Goal  Goal: Respiratory  Outcome: Progressing Towards Goal  Goal: Treatments/Interventions/Procedures  Outcome: Progressing Towards Goal  Goal: Psychosocial  Outcome: Progressing Towards Goal  Goal: *Verbalizes anxiety and depression are reduced or absent  Outcome: Progressing Towards Goal  Goal: *Absence of aspiration  Outcome: Progressing Towards Goal  Goal: *Absence of deep venous thrombosis signs and symptoms(Stroke Metric)  Outcome: Progressing Towards Goal  Goal: *Optimal pain control at patient's stated goal  Outcome: Progressing Towards Goal  Goal: *Tolerating diet  Outcome: Progressing Towards Goal  Goal: *Ability to perform ADLs and demonstrates progressive mobility and function  Outcome: Progressing Towards Goal  Goal: *Stroke education continued(Stroke Metric)  Outcome: Progressing Towards Goal     Problem: Ischemic Stroke: Discharge Outcomes  Goal: *Verbalizes anxiety and depression are reduced or absent  Outcome: Progressing Towards Goal  Goal: *Verbalize understanding of risk factor modification(Stroke Metric)  Outcome: Progressing Towards Goal  Goal: *Hemodynamically stable  Outcome: Progressing Towards Goal  Goal: *Absence of aspiration pneumonia  Outcome: Progressing Towards Goal  Goal: *Aware of needed dietary changes  Outcome: Progressing Towards Goal  Goal: *Verbalize understanding of prescribed medications including anti-coagulants, anti-lipid, and/or anti-platelets(Stroke Metric)  Outcome: Progressing Towards Goal  Goal: *Tolerating diet  Outcome: Progressing Towards Goal  Goal: *Aware of follow-up diagnostics related to anticoagulants  Outcome: Progressing Towards Goal  Goal: *Ability to perform ADLs and demonstrates progressive mobility and function  Outcome: Progressing Towards Goal  Goal: *Absence of DVT(Stroke Metric)  Outcome: Progressing Towards Goal  Goal: *Absence of aspiration  Outcome: Progressing Towards Goal  Goal: *Optimal pain control at patient's stated goal  Outcome: Progressing Towards Goal  Goal: *Home safety concerns addressed  Outcome: Progressing Towards Goal  Goal: *Describes available resources and support systems  Outcome: Progressing Towards Goal  Goal: *Verbalizes understanding of activation of EMS(911) for stroke symptoms(Stroke Metric)  Outcome: Progressing Towards Goal  Goal: *Understands and describes signs and symptoms to report to providers(Stroke Metric)  Outcome: Progressing Towards Goal  Goal: *Neurolgocially stable (absence of additional neurological deficits)  Outcome: Progressing Towards Goal  Goal: *Verbalizes importance of follow-up with primary care physician(Stroke Metric)  Outcome: Progressing Towards Goal  Goal: *Smoking cessation discussed,if applicable(Stroke Metric)  Outcome: Progressing Towards Goal  Goal: *Depression screening completed(Stroke Metric)  Outcome: Progressing Towards Goal     Problem: Pressure Injury - Risk of  Goal: *Prevention of pressure injury  Description: Document John Scale and appropriate interventions in the flowsheet. Outcome: Progressing Towards Goal  Note: Pressure Injury Interventions:  Sensory Interventions: Assess changes in LOC, Assess need for specialty bed, Avoid rigorous massage over bony prominences, Check visual cues for pain, Discuss PT/OT consult with provider, Float heels, Keep linens dry and wrinkle-free, Maintain/enhance activity level, Minimize linen layers, Monitor skin under medical devices, Sit a 90-degree angle/use footstool if needed, Turn and reposition approx.  every two hours (pillows and wedges if needed), Use 30-degree side-lying position    Moisture Interventions: Minimize layers    Activity Interventions: Assess need for specialty bed, Increase time out of bed, PT/OT evaluation    Mobility Interventions: PT/OT evaluation    Nutrition Interventions: Document food/fluid/supplement intake, Offer support with meals,snacks and hydration    Friction and Shear Interventions: HOB 30 degrees or less, Minimize layers                Problem: Patient Education: Go to Patient Education Activity  Goal: Patient/Family Education  Outcome: Progressing Towards Goal

## 2022-08-30 NOTE — PROGRESS NOTES
Bedside and Verbal shift change report given to JAREN Golden (oncoming nurse) by Pearl Ivy (offgoing nurse). Report included the following information SBAR, Kardex, Intake/Output, MAR, Recent Results, Cardiac Rhythm NSR, and Dual Neuro Assessment.

## 2022-08-30 NOTE — PROGRESS NOTES
Bedside and Verbal shift change report given to JAREN Golden (oncoming nurse) by Chelsea Tena (offgoing nurse). Report included the following information SBAR, Kardex, Intake/Output, MAR, Recent Results, Cardiac Rhythm NSR, and Dual Neuro Assessment.

## 2022-08-31 ENCOUNTER — APPOINTMENT (OUTPATIENT)
Dept: GENERAL RADIOLOGY | Age: 47
DRG: 072 | End: 2022-08-31
Attending: PSYCHIATRY & NEUROLOGY
Payer: COMMERCIAL

## 2022-08-31 LAB
APPEARANCE CSF: CLEAR
COLOR CSF: COLORLESS
GLUCOSE CSF-MCNC: 49 MG/DL (ref 40–70)
PROT CSF-MCNC: 36 MG/DL (ref 15–45)
RBC # CSF: 88 /CU MM
TUBE # CSF: 1
WBC # CSF: 0 /CU MM (ref 0–5)

## 2022-08-31 PROCEDURE — 74011250636 HC RX REV CODE- 250/636: Performed by: INTERNAL MEDICINE

## 2022-08-31 PROCEDURE — 84157 ASSAY OF PROTEIN OTHER: CPT

## 2022-08-31 PROCEDURE — 82945 GLUCOSE OTHER FLUID: CPT

## 2022-08-31 PROCEDURE — 86695 HERPES SIMPLEX TYPE 1 TEST: CPT

## 2022-08-31 PROCEDURE — 89050 BODY FLUID CELL COUNT: CPT

## 2022-08-31 PROCEDURE — 86592 SYPHILIS TEST NON-TREP QUAL: CPT

## 2022-08-31 PROCEDURE — 77003 FLUOROGUIDE FOR SPINE INJECT: CPT

## 2022-08-31 PROCEDURE — 009U3ZX DRAINAGE OF SPINAL CANAL, PERCUTANEOUS APPROACH, DIAGNOSTIC: ICD-10-PCS | Performed by: RADIOLOGY

## 2022-08-31 PROCEDURE — 87205 SMEAR GRAM STAIN: CPT

## 2022-08-31 PROCEDURE — 92507 TX SP LANG VOICE COMM INDIV: CPT

## 2022-08-31 PROCEDURE — 74011636637 HC RX REV CODE- 636/637: Performed by: HOSPITALIST

## 2022-08-31 PROCEDURE — 74011250637 HC RX REV CODE- 250/637: Performed by: INTERNAL MEDICINE

## 2022-08-31 PROCEDURE — 86255 FLUORESCENT ANTIBODY SCREEN: CPT

## 2022-08-31 PROCEDURE — 86617 LYME DISEASE ANTIBODY: CPT

## 2022-08-31 PROCEDURE — 74011000250 HC RX REV CODE- 250: Performed by: INTERNAL MEDICINE

## 2022-08-31 PROCEDURE — 65270000046 HC RM TELEMETRY

## 2022-08-31 PROCEDURE — 74011250637 HC RX REV CODE- 250/637: Performed by: HOSPITALIST

## 2022-08-31 RX ORDER — ENOXAPARIN SODIUM 100 MG/ML
40 INJECTION SUBCUTANEOUS EVERY 24 HOURS
Status: DISCONTINUED | OUTPATIENT
Start: 2022-08-31 | End: 2022-09-03 | Stop reason: HOSPADM

## 2022-08-31 RX ORDER — LIDOCAINE HYDROCHLORIDE 20 MG/ML
10 INJECTION, SOLUTION EPIDURAL; INFILTRATION; INTRACAUDAL; PERINEURAL
Status: COMPLETED | OUTPATIENT
Start: 2022-08-31 | End: 2022-08-31

## 2022-08-31 RX ORDER — SODIUM BICARBONATE 42 MG/ML
2 INJECTION, SOLUTION INTRAVENOUS
Status: DISPENSED | OUTPATIENT
Start: 2022-08-31 | End: 2022-08-31

## 2022-08-31 RX ADMIN — ATENOLOL 25 MG: 25 TABLET ORAL at 21:03

## 2022-08-31 RX ADMIN — ENOXAPARIN SODIUM 40 MG: 100 INJECTION SUBCUTANEOUS at 21:02

## 2022-08-31 RX ADMIN — ATENOLOL 25 MG: 25 TABLET ORAL at 09:50

## 2022-08-31 RX ADMIN — BUPROPION HYDROCHLORIDE 300 MG: 300 TABLET ORAL at 09:47

## 2022-08-31 RX ADMIN — ASPIRIN 81 MG CHEWABLE TABLET 81 MG: 81 TABLET CHEWABLE at 09:49

## 2022-08-31 RX ADMIN — LIDOCAINE HYDROCHLORIDE 5 ML: 20 INJECTION, SOLUTION INTRAVENOUS at 09:34

## 2022-08-31 RX ADMIN — LEFLUNOMIDE 20 MG: 10 TABLET ORAL at 09:54

## 2022-08-31 RX ADMIN — BACLOFEN 10 MG: 10 TABLET ORAL at 09:49

## 2022-08-31 RX ADMIN — LEVOTHYROXINE SODIUM 175 MCG: 0.15 TABLET ORAL at 06:40

## 2022-08-31 RX ADMIN — BACLOFEN 10 MG: 10 TABLET ORAL at 17:57

## 2022-08-31 RX ADMIN — Medication: at 09:47

## 2022-08-31 RX ADMIN — MONTELUKAST 10 MG: 10 TABLET, FILM COATED ORAL at 21:04

## 2022-08-31 RX ADMIN — LAMOTRIGINE 25 MG: 25 TABLET ORAL at 09:49

## 2022-08-31 RX ADMIN — PRAVASTATIN SODIUM 10 MG: 10 TABLET ORAL at 21:04

## 2022-08-31 RX ADMIN — LAMOTRIGINE 200 MG: 100 TABLET ORAL at 21:03

## 2022-08-31 RX ADMIN — PREDNISONE 10 MG: 10 TABLET ORAL at 09:49

## 2022-08-31 RX ADMIN — BUPROPION HYDROCHLORIDE 300 MG: 300 TABLET ORAL at 21:16

## 2022-08-31 RX ADMIN — LEVOTHYROXINE SODIUM 87.5 MCG: 0.03 TABLET ORAL at 09:54

## 2022-08-31 RX ADMIN — ACETAMINOPHEN 650 MG: 325 TABLET, FILM COATED ORAL at 15:56

## 2022-08-31 RX ADMIN — SERTRALINE 200 MG: 50 TABLET, FILM COATED ORAL at 21:04

## 2022-08-31 NOTE — PROGRESS NOTES
Problem: Patient Education: Go to Patient Education Activity  Goal: Patient/Family Education  Outcome: Progressing Towards Goal     Problem: Falls - Risk of  Goal: *Absence of Falls  Description: Document Green Salvia Fall Risk and appropriate interventions in the flowsheet.   Outcome: Progressing Towards Goal  Note: Fall Risk Interventions:       Mentation Interventions: Adequate sleep, hydration, pain control, Door open when patient unattended, Evaluate medications/consider consulting pharmacy, Increase mobility, More frequent rounding, Reorient patient, Room close to nurse's station, Toileting rounds, Update white board    Medication Interventions: Assess postural VS orthostatic hypotension, Evaluate medications/consider consulting pharmacy, Teach patient to arise slowly, Utilize gait belt for transfers/ambulation, Patient to call before getting OOB                   Problem: Patient Education: Go to Patient Education Activity  Goal: Patient/Family Education  Outcome: Progressing Towards Goal     Problem: Patient Education: Go to Patient Education Activity  Goal: Patient/Family Education  Outcome: Progressing Towards Goal     Problem: General Medical Care Plan  Goal: *Vital signs within specified parameters  Outcome: Progressing Towards Goal  Goal: *Labs within defined limits  Outcome: Progressing Towards Goal  Goal: *Absence of infection signs and symptoms  Outcome: Progressing Towards Goal  Goal: *Optimal pain control at patient's stated goal  Outcome: Progressing Towards Goal  Goal: *Skin integrity maintained  Outcome: Progressing Towards Goal  Goal: *Fluid volume balance  Outcome: Progressing Towards Goal  Goal: *Optimize nutritional status  Outcome: Progressing Towards Goal  Goal: *Anxiety reduced or absent  Outcome: Progressing Towards Goal  Goal: *Progressive mobility and function (eg: ADL's)  Outcome: Progressing Towards Goal

## 2022-08-31 NOTE — ADT AUTH CERT NOTES
Neurology [address] [place] [address] - Care Day 0 (8/29/2022) by Claudia Jones RN       Review Status Review Entered   Completed 8/29/2022 11:32      Criteria Review      Care Day: 0 Care Date: 8/29/2022 Level of Care:    Guideline Day 1    Clinical Status    ( ) * Clinical Indications met    * Milestone   Additional Notes   8/29   Interval history / Subjective:   Awake, alert, states that she has been followed by [facility] but wishes to come to [address] [place] [address] with long history unable to explain her mental health state/memory lapses/headaches/etc.       Assessment & Plan:       History of AMS/chronic confusion   -pt reports long history of memory lapses, headaches, etc which she gets all her care at [address] [place] [address] and wishes to get her care at [address] [place] [address] now.   -mri/mra noted   -pending eeg   -neurology consulted       History of mental mariana/depression/anxiety   -pt states she has narcolepsy as well, on stimulants   -does not voice si/hi/hallucinations   -wellbutrin held on admission and was resumed her other home meds       Hypothyroidism   -on replacement tx       Hypertension   -asymptomatic   -monitor/trend   -adjust meds as needed       See other orders as plan       Code status: full   Prophylaxis: lovenox   Care Plan discussed with: pt   Anticipated Disposition: tbd, pending tests       Ordered psych consult       /66 (BP 1 Location: Right upper arm, BP Patient Position: At rest)   Pulse 74   Temp 98.2 °F (36.8 °C)   Resp 21   Ht 5' 7\" (1.702 m)   Wt 85.7 kg (189 lb)   SpO2 97%  RA      Constitutional: No acute distress, cooperative, pleasant    ENT: Oral mucosa moist, oropharynx benign. Resp: CTA bilaterally. No wheezing/rhonchi/rales. No accessory muscle use. CV: Regular rhythm, normal rate, no murmurs, gallops, rubs    GI: Soft, non distended, non tender. normoactive bowel sounds, no hepatosplenomegaly     Musculoskeletal: No edema, warm, 2+ pulses throughout    Neurologic: Moves all extremities.   AAOx3, CN II-XII reviewed Neurology [address] Day -1 (8/28/2022) by Edel Parr RN       Review Status Review Entered   Completed 8/29/2022 11:27      Criteria Review      Care Day: -1 Care Date: 8/28/2022 Level of Care:    Guideline Day 1    Clinical Status    ( ) * Clinical Indications met    * Milestone   Additional Notes   8/28   Chief Complaint: Altered mental status           History of Presenting Illness:   Joaquim Fernandez is a 42 y.o. 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 Pt arrives for intermittent confusion, periods of inability to perform known tasks and not recognizing known places. These symptoms have been persistent x 2 weeks, she was seen at [facility] ED on Friday, had neuro workup and eval to include CTA head and neck and neurologist ehsan and dc'd with plan to follow up this week with neuro but now has concern for seizure activity due to presenting symptoms. Pt appears very anxious and has scattered thought processes with difficulty explaining symptoms. A&OX4, GCS 15. Denies any recent trauma or injuries.  .  Telemetry neurology eval would recommend admission for possible focal seizures, also recommend a brain MRI, MRA           BP (!) 149/124   Pulse 81   Temp 98.3 °F (36.8 °C)   Resp 14   Ht 5' 7\" (1.702 m)   Wt 85.7 kg (189 lb)   SpO2 97%   BMI 29.60 kg/m²      O2 Device: None (Room air)       PHYSICAL EXAM:    General: Alert x oriented x 3, awake, anxious    HEENT: PEERL, EOMI, moist mucus membranes   Neck: Supple, no JVD, no meningeal signs   Chest: Clear to auscultation bilaterally    CVS: RRR, S1 S2 heard, no murmurs/rubs/gallops   Abd: Soft, non-tender, non-distended, +bowel sounds    Ext: No clubbing, no cyanosis, no edema   Neuro/Psych: Pleasant mood and affect, CN 2-12 grossly intact, sensory grossly within normal limit, Strength 5/5 in all extremities, DTR 1+ x 4   Cap refill: Brisk, less than 3 seconds   Pulses: 2+, symmetric in all extremities   Skin: Warm, dry, without rashes or lesions       Assessment:   Given the patient's current clinical presentation, there is a high level of concern for decompensation if discharged from the emergency department. Complex decision making was performed, which includes reviewing the patient's available past medical records, laboratory results, and imaging studies. Active Problems:     Confusion (8/28/2022)       Plan:       1. Confusion: Etiology unclear, will check EEG, brain MRI, brain MRA and neck MRA. If notice any stroke, then may need further stroke work-up including echo. 2. History of depression: With her her current presentation, sending about her her anxiety component, or bipolar component, consider psych evaluation if for above work-up negative. Resume home meds, hold her home Wellbutrin     3. Hypothyroidism, resume home meds   4. Hypertension: Resume home meds                   DIET: ADULT DIET Regular    ISOLATION PRECAUTIONS: There are currently no Active Isolations   CODE STATUS: Full Code    DVT PROPHYLAXIS: Lovenox   FUNCTIONAL STATUS PRIOR TO HOSPITALIZATION: Fully active and ambulatory; able to carry on all self-care without restriction. EARLY MOBILITY ASSESSMENT: Recommend routine ambulation while hospitalized with the assistance of nursing staff   ANTICIPATED DISCHARGE: 24-48 hours. LABS:   8/28/22 18:20   WBC: 11.7 (H)   NRBC: 0.0   RBC: 4.25   HGB: 14.5   HCT: 41.5   MCV: 97.6   MCH: 34.1 (H)   MCHC: 34.9   RDW: 11.9   PLATELET: 502   MPV: 9.6   NEUTROPHILS: 78 (H)   LYMPHOCYTES: 11 (L)   MONOCYTES: 8   EOSINOPHILS: 1   BASOPHILS: 1   IMMATURE GRANULOCYTES: 1 (H)   DF: AUTOMATED   ABSOLUTE NRBC: 0.00   ABS. NEUTROPHILS: 9.3 (H)   ABS. IMM. GRANS.: 0.1 (H)   ABS. LYMPHOCYTES: 1.3   ABS. MONOCYTES: 0.9   ABS. EOSINOPHILS: 0.1   ABS.  BASOPHILS: 0.1   Sed rate, automated: 6   Color: YELLOW/STRAW   Appearance: CLEAR   Specific gravity: 1.011   pH (UA): 6.5   Protein: Negative   Glucose: Negative   Ketone: Negative   Blood: Negative   Bilirubin: Negative   Urobilinogen: 0.2   Nitrites: Negative   Leukocyte Esterase: Negative   Sodium: 137   Potassium: 4.1   Chloride: 106   CO2: 30   Anion gap: 1 (L)   Glucose: 89   BUN: 11   Creatinine: 0.70   BUN/Creatinine ratio: 16   Calcium: 9.1   GFR est non-AA: >60   GFR est AA: >60   Bilirubin, total: 0.3   Protein, total: 7.1   Albumin: 3.7   Globulin: 3.4   A-G Ratio: 1.1   ALT: 29   AST: 18   Alk.  phosphatase: 73      MEDS:   atenoloL (TENORMIN) tablet 25 mg   Dose: 25 mg   Freq: EVERY 12 HOURS Route: PO      enoxaparin (LOVENOX) injection 40 mg   Dose: 40 mg   Freq: EVERY 24 HOURS Route: SC      lamoTRIgine (LaMICtal) tablet 200 mg   Dose: 200 mg   Freq: EVERY BEDTIME Route: PO      sertraline (ZOLOFT) tablet 200 mg   Dose: 200 mg   Freq: EVERY BEDTIME Route: PO      pravastatin (PRAVACHOL) tablet 10 mg   Dose: 10 mg   Freq: EVERY BEDTIME Route: PO

## 2022-08-31 NOTE — PROGRESS NOTES
Transition of Care Plan: Home with OP therapy     RUR: 7% low     PCP F/U: Dr. Da Franz     Disposition: Home with OP therapy     Transportation: family? Main Contact: : Clifton Briscoe [phone]     58: LP likely today.  Will continue to follow    Manuel Rascon RN, CRM

## 2022-08-31 NOTE — PROGRESS NOTES
SPEECH LANGUAGE PATHOLOGY TREATMENT/DISCHARGE  Patient: Pippa Andersen (65 y.o. female)  Date: 8/31/2022  Diagnosis: Confusion [R41.0]  Altered mental status [R41.82]  Carotid artery disease ([facility] [I77.9]  [facility] [I72.9] <principal problem not specified>      Precautions:       ASSESSMENT:  Patient seen upright in bed. Patient reports word retrieval deficits and \"cognitive slowing. \"  Note patient highly verbose and tangential which she notes to be consistent with her baseline level. She reports extreme frustration with slow response time to stimuli. She states that she has multiple degrees and is used to functioning at a very high level. Completed assessment of language and cognition on this date including [facility] Aphasia Screening Test and patient scored 100/100 with no noted deficits with expression/reception. Also completed the cognitive log which patient scored 30/30. No deficits noted with language and/or cognition. Patient reports relief to know that she performed so well with tasks. Provided patient with strategies for brain fog including allowing extra response time, taking breaks when needed. Patient highly receptive to these strategies. At this time, will complete order and discharge from SLP treatment. No further needs noted. PLAN:  Patient will be discharged from skilled acute speech therapy at this time. Rationale for discharge:  Goals achieved    Discharge Recommendations: To Be Determined     SUBJECTIVE:   Patient stated I am just like this. OBJECTIVE:   Mental Status:  Neurologic State: Alert  Orientation Level: Oriented X4  Cognition: Decreased attention/concentration  Perception: Appears intact  Perseveration: No perseveration noted  Safety/Judgement: Insight into deficits  Treatment & Interventions:   Motor Speech:                       Speech Characteristics: Other (comment) (verbose)           Language Comprehension and Expression:  Auditory Comprehension   Auditory Impairment: No   Verbal Expression  Verbal Expression  Naming: No impairment  Sentence Completion: No impairment  Conversation: Fluent  Speech Characteristics: Other (comment) (verbose)  Overall Impairment: None  Neuro-Linguistics:                    Memory: No Impairment   Attention : No Impairement                          Voice:            NOMS: The NOMS functional outcome measure was used to quantify this patient's level of memory impairment. Based on the NOMS, the patient was determined to be at level 7 for memory function. NOMS Memory:  Level 1 (CN): Unable to recall any information regardless of cues  Level 2 (CM): Constant max cues or external aids to recall personal info  Level 3 (CL): Max cues or use external aids for simple routine and personal info. Level 4 (CK): Min cues to recall/use aids for simple info. Max cues to recall/ use aids for complex/novel info and plan/follow through on simple future events    Level 5 (CJ): Min cues to recall/use aids for complex/novel info and to plan/follow through complex future events  Level 6 (CI): Recalls or uses aids/strategies for complex info & planning future events. Min cues for breakdowns   Level 7 ([address] [place] [address]): Independent with recall/use of aids/strategies. ELIZABETH. (2003). National Outcomes Measurement System (NOMS): Adult Speech-Language Pathology User's Guide. Response & Tolerance to Activities:       Pain:  Pain Scale 1: Numeric (0 - 10)  Pain Intensity 1: 0       After treatment:   Patient left in no apparent distress in bed, Call bell within reach, and Nursing notified    COMMUNICATION/EDUCATION:   Patient was educated regarding her deficit(s) of functional language and cognition as this relates to her diagnosis of AMS. She demonstrated Good understanding as evidenced by verbalized understanding.     The patient's plan of care including recommendations, planned interventions, and recommended diet changes were discussed with: Registered nurse.      Pam Sol, SLP  Time Calculation: 25 mins

## 2022-08-31 NOTE — PROGRESS NOTES
[address] [facility]  Hospitalist Group                                                                                          Hospitalist Progress Note  Yogesh Dickens MD  Answering service: [phone] from in house phone        Date of Service:  2022  NAME:  Val Fernandez  :  1975  MRN:  [id number]      Admission Summary:   HPI: Toi Bains is a 42 y.o. 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 Pt arrives for intermittent confusion, periods of inability to perform known tasks and not recognizing known places. These symptoms have been persistent x 2 weeks, she was seen at [facility] ED on Friday, had neuro workup and eval to include CTA head and neck and neurologist eval and dc'd with plan to follow up this week with neuro but now has concern for seizure activity due to presenting symptoms. Pt appears very anxious and has scattered thought processes with difficulty explaining symptoms. A&OX4, GCS 15. Denies any recent trauma or injuries.  .  Telemetry neurology eval would recommend admission for possible focal seizures, also recommend a brain MRI, MRA\"       Interval history / Subjective:   S/p LP, pt updated, she has her phone with long list of items of various times she has forgotten something or something out of the norm that she reads out     Assessment & Plan:     History of AMS/chronic confusion  -pt reports long history of memory lapses, headaches, etc which she gets all her care at [facility] and wishes to get her care at [facility] now.  -mri/mra noted  -eeg  -s/p LP, pending cultures  -neurology consulted    History of mental mariana/depression/anxiety  -pt states she has narcolepsy as well, on stimulants  -does not voice si/hi/hallucinations  -psych consulted  -calm, does not voice si/hi/hallucinations    Hypothyroidism  -on replacement tx    Hypertension  -asymptomatic  -monitor/trend  -adjust meds as needed    See other orders as plan     Code status: full  Prophylaxis: lovenox held on morning or LP, resume later [address] discussed with: pt  Anticipated Disposition: tbd      --followup on LP culture, followup with Neurology for guidance of disop--     Hospital Problems  Date Reviewed: 3/1/2021            Codes Class Noted POA    [facility] ICD-10-CM: I72.9  ICD-9-CM: 442.9  8/30/2022 Unknown        Altered mental status ICD-10-CM: R41.82  ICD-9-CM: 780.97  8/30/2022 Unknown        Carotid artery disease ([facility] ICD-10-CM: I77.9  ICD-9-CM: 447.9  8/30/2022 Unknown        AMS (altered mental status) ICD-10-CM: R41.82  ICD-9-CM: 780.97  8/28/2022 Unknown         Review of Systems:   A comprehensive review of systems was negative except for that written in the HPI. Vital Signs:    Last 24hrs VS reviewed since prior progress note. Most recent are:  Visit Vitals  /66   Pulse 70   Temp 98.3 °F (36.8 °C)   Resp 13   Ht 5' 7\" (1.702 m)   Wt 81.9 kg (180 lb 9.6 oz)   SpO2 97%   BMI 28.29 kg/m²         Intake/Output Summary (Last 24 hours) at 8/31/2022 1631  Last data filed at 8/31/2022 0400  Gross per 24 hour   Intake --   Output 750 ml   Net -750 ml          Physical Examination:     I had a face to face encounter with this patient and independently examined them on 8/31/2022 as outlined below:          Constitutional:  No acute distress, cooperative, pleasant    ENT:  Oral mucosa moist, oropharynx benign. Resp:  CTA bilaterally. No wheezing/rhonchi/rales. No accessory muscle use. CV:  Regular rhythm, normal rate, no rubs    GI:  Soft, non distended, non tender. normoactive bowel sounds      Back: nontender, no erythema of area of LP site, no ecchymosis    Musculoskeletal:  No edema, warm, 2+ pulses throughout    Neurologic:  Moves all extremities.   AAOx3,  pt mentions memory lapses with long list/written items on her phone       Psych: calm, not agitated, does not voice si/hihallucinations     Data Review:    Review and/or order of clinical lab test      Labs:     Recent Labs     08/30/22  0424 08/28/22  1820   WBC 10.4 11.7*   HGB 13.9 14.5   HCT 40.1 41.5    224       Recent Labs     08/30/22  0424 08/28/22  1820    137   K 4.2 4.1    106   CO2 29 30   BUN 10 11   CREA 0.58 0.70   GLU 79 89   CA 8.9 9.1   PHOS 2.9  --        Recent Labs     08/30/22  0424 08/28/22  1820   ALT  --  29   AP  --  73   TBILI  --  0.3   TP  --  7.1   ALB 3.3* 3.7   GLOB  --  3.4         Lab Results   Component Value Date/Time    Color YELLOW/STRAW 08/28/2022 06:20 PM    Appearance CLEAR 08/28/2022 06:20 PM    Specific gravity 1.011 08/28/2022 06:20 PM    pH (UA) 6.5 08/28/2022 06:20 PM    Protein Negative 08/28/2022 06:20 PM    Glucose Negative 08/28/2022 06:20 PM    Ketone Negative 08/28/2022 06:20 PM    Bilirubin Negative 08/28/2022 06:20 PM    Urobilinogen 0.2 08/28/2022 06:20 PM    Nitrites Negative 08/28/2022 06:20 PM    Leukocyte Esterase Negative 08/28/2022 06:20 PM         Medications Reviewed:     Current Facility-Administered Medications   Medication Dose Route Frequency    sodium bicarbonate (4.2%) injection 84 mg  2 mL SubCUTAneous RAD ONCE    baclofen (LIORESAL) tablet 10 mg  10 mg Oral BID    ondansetron (ZOFRAN) injection 4 mg  4 mg IntraVENous Q6H PRN    lactulose (CHRONULAC) 10 gram/15 mL solution 15 mL  10 g Oral DAILY PRN    buPROPion XL (WELLBUTRIN XL) tablet 300 mg- Patient's own med (Patient Supplied)  300 mg Oral QHS    acetaminophen (TYLENOL) tablet 650 mg  650 mg Oral Q4H PRN    Or    acetaminophen (TYLENOL) solution 650 mg  650 mg Per NG tube Q4H PRN    Or    acetaminophen (TYLENOL) suppository 650 mg  650 mg Rectal Q4H PRN    aspirin chewable tablet 81 mg  81 mg Oral DAILY    atenoloL (TENORMIN) tablet 25 mg  25 mg Oral Q12H    fexofenadine (ALLEGRA) 180 mg tablet- Patient own med (Patient Supplied)   Oral DAILY    lamoTRIgine (LaMICtal) tablet 200 mg  200 mg Oral QHS    lamoTRIgine (LaMICtal) tablet 25 mg  25 mg Oral DAILY    leflunomide (ARAVA) tablet 20 mg  20 mg Oral DAILY    levothyroxine (SYNTHROID) tablet 175 mcg  175 mcg Oral Once per day on Mon Tue Thu Fri Sat    levothyroxine (SYNTHROID) tablet 262.5 mcg  262.5 mcg Oral every Wednesday    montelukast (SINGULAIR) tablet 10 mg  10 mg Oral QHS    pravastatin (PRAVACHOL) tablet 10 mg  10 mg Oral QHS    predniSONE (DELTASONE) tablet 10 mg  10 mg Oral DAILY    sertraline (ZOLOFT) tablet 200 mg  200 mg Oral QHS    Lisdexamfetamine (VYVANSE) capsule 70 mg- Patient's own med (Patient Supplied)  70 mg Oral DAILY     ______________________________________________________________________  EXPECTED LENGTH OF STAY: 2d 14h  ACTUAL LENGTH OF STAY:          1                 Sheila Hernandez MD

## 2022-09-01 LAB
ANION GAP SERPL CALC-SCNC: 4 MMOL/L (ref 5–15)
BASOPHILS # BLD: 0.1 K/UL (ref 0–0.1)
BASOPHILS NFR BLD: 1 % (ref 0–1)
BUN SERPL-MCNC: 12 MG/DL (ref 6–20)
BUN/CREAT SERPL: 17 (ref 12–20)
CALCIUM SERPL-MCNC: 8.8 MG/DL (ref 8.5–10.1)
CHLORIDE SERPL-SCNC: 108 MMOL/L (ref 97–108)
CO2 SERPL-SCNC: 24 MMOL/L (ref 21–32)
CREAT SERPL-MCNC: 0.7 MG/DL (ref 0.55–1.02)
DIFFERENTIAL METHOD BLD: ABNORMAL
EOSINOPHIL # BLD: 0.2 K/UL (ref 0–0.4)
EOSINOPHIL NFR BLD: 1 % (ref 0–7)
ERYTHROCYTE [DISTWIDTH] IN BLOOD BY AUTOMATED COUNT: 11.8 % (ref 11.5–14.5)
GLUCOSE SERPL-MCNC: 80 MG/DL (ref 65–100)
HCT VFR BLD AUTO: 41.7 % (ref 35–47)
HERPES SIMPLEX VIRUS, CSF, UHSPT: NORMAL
HGB BLD-MCNC: 14.1 G/DL (ref 11.5–16)
IMM GRANULOCYTES # BLD AUTO: 0.1 K/UL (ref 0–0.04)
IMM GRANULOCYTES NFR BLD AUTO: 1 % (ref 0–0.5)
LYMPHOCYTES # BLD: 3.8 K/UL (ref 0.8–3.5)
LYMPHOCYTES NFR BLD: 29 % (ref 12–49)
MCH RBC QN AUTO: 33.3 PG (ref 26–34)
MCHC RBC AUTO-ENTMCNC: 33.8 G/DL (ref 30–36.5)
MCV RBC AUTO: 98.3 FL (ref 80–99)
MONOCYTES # BLD: 1.4 K/UL (ref 0–1)
MONOCYTES NFR BLD: 10 % (ref 5–13)
NEUTS SEG # BLD: 7.8 K/UL (ref 1.8–8)
NEUTS SEG NFR BLD: 58 % (ref 32–75)
NRBC # BLD: 0 K/UL (ref 0–0.01)
NRBC BLD-RTO: 0 PER 100 WBC
PLATELET # BLD AUTO: 257 K/UL (ref 150–400)
PMV BLD AUTO: 9.6 FL (ref 8.9–12.9)
POTASSIUM SERPL-SCNC: 3.7 MMOL/L (ref 3.5–5.1)
RBC # BLD AUTO: 4.24 M/UL (ref 3.8–5.2)
REAGIN AB CSF QL: NON REACTIVE
SODIUM SERPL-SCNC: 136 MMOL/L (ref 136–145)
TSH SERPL DL<=0.05 MIU/L-ACNC: 2.66 UIU/ML (ref 0.36–3.74)
WBC # BLD AUTO: 13.3 K/UL (ref 3.6–11)

## 2022-09-01 PROCEDURE — 36415 COLL VENOUS BLD VENIPUNCTURE: CPT

## 2022-09-01 PROCEDURE — 74011636637 HC RX REV CODE- 636/637: Performed by: HOSPITALIST

## 2022-09-01 PROCEDURE — 95714 VEEG EA 12-26 HR UNMNTR: CPT | Performed by: NURSE PRACTITIONER

## 2022-09-01 PROCEDURE — 99233 SBSQ HOSP IP/OBS HIGH 50: CPT | Performed by: NURSE PRACTITIONER

## 2022-09-01 PROCEDURE — 65270000046 HC RM TELEMETRY

## 2022-09-01 PROCEDURE — 80048 BASIC METABOLIC PNL TOTAL CA: CPT

## 2022-09-01 PROCEDURE — 74011250636 HC RX REV CODE- 250/636: Performed by: INTERNAL MEDICINE

## 2022-09-01 PROCEDURE — 85025 COMPLETE CBC W/AUTO DIFF WBC: CPT

## 2022-09-01 PROCEDURE — 74011250637 HC RX REV CODE- 250/637: Performed by: HOSPITALIST

## 2022-09-01 PROCEDURE — 74011250637 HC RX REV CODE- 250/637: Performed by: INTERNAL MEDICINE

## 2022-09-01 PROCEDURE — 84443 ASSAY THYROID STIM HORMONE: CPT

## 2022-09-01 RX ADMIN — ACETAMINOPHEN 650 MG: 325 TABLET, FILM COATED ORAL at 21:13

## 2022-09-01 RX ADMIN — BACLOFEN 10 MG: 10 TABLET ORAL at 21:12

## 2022-09-01 RX ADMIN — PREDNISONE 10 MG: 10 TABLET ORAL at 10:42

## 2022-09-01 RX ADMIN — SERTRALINE 200 MG: 50 TABLET, FILM COATED ORAL at 21:44

## 2022-09-01 RX ADMIN — Medication: at 10:51

## 2022-09-01 RX ADMIN — BACLOFEN 10 MG: 10 TABLET ORAL at 10:41

## 2022-09-01 RX ADMIN — ATENOLOL 25 MG: 25 TABLET ORAL at 10:41

## 2022-09-01 RX ADMIN — ASPIRIN 81 MG CHEWABLE TABLET 81 MG: 81 TABLET CHEWABLE at 10:40

## 2022-09-01 RX ADMIN — MONTELUKAST 10 MG: 10 TABLET, FILM COATED ORAL at 21:44

## 2022-09-01 RX ADMIN — LEVOTHYROXINE SODIUM 175 MCG: 0.15 TABLET ORAL at 06:48

## 2022-09-01 RX ADMIN — LAMOTRIGINE 25 MG: 25 TABLET ORAL at 10:42

## 2022-09-01 RX ADMIN — LEFLUNOMIDE 20 MG: 10 TABLET ORAL at 10:41

## 2022-09-01 RX ADMIN — PRAVASTATIN SODIUM 10 MG: 10 TABLET ORAL at 21:45

## 2022-09-01 RX ADMIN — BUPROPION HYDROCHLORIDE 300 MG: 300 TABLET ORAL at 22:00

## 2022-09-01 RX ADMIN — ATENOLOL 25 MG: 25 TABLET ORAL at 21:44

## 2022-09-01 RX ADMIN — ENOXAPARIN SODIUM 40 MG: 100 INJECTION SUBCUTANEOUS at 21:45

## 2022-09-01 RX ADMIN — ACETAMINOPHEN 650 MG: 325 TABLET, FILM COATED ORAL at 14:03

## 2022-09-01 NOTE — PROGRESS NOTES
[facility] [facility]  Hospitalist Group                                                                                          Hospitalist Progress Note  Bruce Worley MD  Answering service: [phone] from in house phone        Date of Service:  2022  NAME:  Anatoliy Holguin  :  1975  MRN:  [id number]      Admission Summary:   HPI: Liliana Tran is a 42 y.o. 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 Pt arrives for intermittent confusion, periods of inability to perform known tasks and not recognizing known places. These symptoms have been persistent x 2 weeks, she was seen at [address] [place] [address] ED on Friday, had neuro workup and eval to include CTA head and neck and neurologist eval and dc'd with plan to follow up this week with neuro but now has concern for seizure activity due to presenting symptoms. Pt appears very anxious and has scattered thought processes with difficulty explaining symptoms. A&OX4, GCS 15. Denies any recent trauma or injuries.  Telemetry neurology eval would recommend admission for possible focal seizures, also recommend a brain MRI, MRA\"     Interval history / Subjective:   Pt continues to report various things or events that have occurred that are out of the norm for her or that she has forgotten and then remembers later  She is convinced she is having intermittent focal temporal lobe seizures  NAD but is anxious     Assessment & Plan:     Acute encephalopathy/confusion/memory lapses x2 weeks  Pt gets all her care at [address] [place] [address] and wishes to get her care at [facility] now  -MRI/A head and neck did not reveal acute intracranial pathology and there was noted L cervical ICA flow gap and R cervical ICA chronic dissection/pseudoaneurysm  -rapid EEG on admit was wnl  -vEEG wnl  -s/p LP, unremarkable thus far  -wonder if would benefit from a longer duration of rapid EEG to completely rule out non-convulsive status prior to d/c?  -per neurology, there is no restriction on her driving but I would advise her if she is anxious or uncomfortable then she should avoid driving all together. Continue home Lamictal 25 mg qam and 200 mg qpm. Continue baclofen 10 mg BID for headaches (which she states are improved). Have patient follow-up with outpatient Psychiatrist about Wellbutrin since she is concerned about lowering seizure threshold and if better option. There is no indication to discontinue this now. Neurology will now sign-off on the patient. Dr. Chuck Watkins will be notified to have her follow-up appointment scheduled. History of depression/anxiety  -pt states she has narcolepsy as well, on stimulants  -psych consulted, Patient appears to be stable and is not at risk to harm self or others at this time. Psych admission is not indicated and patient can be discharged when medically cleared    Hypothyroidism  -on replacement tx, TSH wnl    Hypertension, controlled  -cont meds     Code status: full  Prophylaxis: lovenox  Care Plan discussed with: pt, cm, rn  Anticipated Disposition: home 24h     Hospital Problems  Date Reviewed: 3/1/2021            Codes Class Noted POA    Pseudoaneurysm ([facility] ICD-10-CM: I72.9  ICD-9-CM: 442.9  8/30/2022 Unknown        Altered mental status ICD-10-CM: R41.82  ICD-9-CM: 780.97  8/30/2022 Unknown        Carotid artery disease ([facility] ICD-10-CM: I77.9  ICD-9-CM: 447.9  8/30/2022 Unknown        AMS (altered mental status) ICD-10-CM: R41.82  ICD-9-CM: 780.97  8/28/2022 Unknown         Review of Systems:   A comprehensive review of systems was negative except for that written in the HPI. Vital Signs:    Last 24hrs VS reviewed since prior progress note.  Most recent are:  Visit Vitals  /70 (BP 1 Location: Right arm, BP Patient Position: Sitting)   Pulse 66   Temp 97.1 °F (36.2 °C)   Resp 17   Ht 5' 7\" (1.702 m)   Wt 81.9 kg (180 lb 9.6 oz)   SpO2 98%   BMI 28.29 kg/m²       No intake or output data in the 24 hours ending 09/01/22 1601       Physical Examination:     I had a face to face encounter with this patient and independently examined them on 9/1/2022 as outlined below:          Constitutional:  No acute distress, cooperative, pleasant    ENT:  Oral mucosa moist, oropharynx benign. Resp:  CTA bilaterally. No wheezing/rhonchi/rales. No accessory muscle use. CV:  Regular rhythm, normal rate, no murmur    GI:  Soft, non distended, non tender    Musculoskeletal:  No edema, warm    Neurologic:              Psych:  Moves all extremities.  AAOx3, pt gives examples of her memory lapses, including when she watched a movie with someone last week and afterward her friend recalled a scene, but she could only recall the beginning of that scene but not the rest of the scene  Anxious, not agitated            Data Review:    Review and/or order of clinical lab test      Labs:     Recent Labs     09/01/22  0246 08/30/22  0424   WBC 13.3* 10.4   HGB 14.1 13.9   HCT 41.7 40.1    230       Recent Labs     09/01/22  0246 08/30/22  0424    139   K 3.7 4.2    108   CO2 24 29   BUN 12 10   CREA 0.70 0.58   GLU 80 79   CA 8.8 8.9   PHOS  --  2.9       Recent Labs     08/30/22  0424   ALB 3.3*         Lab Results   Component Value Date/Time    Color YELLOW/STRAW 08/28/2022 06:20 PM    Appearance CLEAR 08/28/2022 06:20 PM    Specific gravity 1.011 08/28/2022 06:20 PM    pH (UA) 6.5 08/28/2022 06:20 PM    Protein Negative 08/28/2022 06:20 PM    Glucose Negative 08/28/2022 06:20 PM    Ketone Negative 08/28/2022 06:20 PM    Bilirubin Negative 08/28/2022 06:20 PM    Urobilinogen 0.2 08/28/2022 06:20 PM    Nitrites Negative 08/28/2022 06:20 PM    Leukocyte Esterase Negative 08/28/2022 06:20 PM         Medications Reviewed:     Current Facility-Administered Medications   Medication Dose Route Frequency    enoxaparin (LOVENOX) injection 40 mg  40 mg SubCUTAneous Q24H    baclofen (LIORESAL) tablet 10 mg  10 mg Oral BID    ondansetron (ZOFRAN) injection 4 mg  4 mg IntraVENous Q6H PRN    lactulose (CHRONULAC) 10 gram/15 mL solution 15 mL  10 g Oral DAILY PRN    buPROPion XL (WELLBUTRIN XL) tablet 300 mg- Patient's own med (Patient Supplied)  300 mg Oral QHS    acetaminophen (TYLENOL) tablet 650 mg  650 mg Oral Q4H PRN    Or    acetaminophen (TYLENOL) solution 650 mg  650 mg Per NG tube Q4H PRN    Or    acetaminophen (TYLENOL) suppository 650 mg  650 mg Rectal Q4H PRN    aspirin chewable tablet 81 mg  81 mg Oral DAILY    atenoloL (TENORMIN) tablet 25 mg  25 mg Oral Q12H    fexofenadine (ALLEGRA) 180 mg tablet- Patient own med (Patient Supplied)   Oral DAILY    lamoTRIgine (LaMICtal) tablet 200 mg  200 mg Oral QHS    lamoTRIgine (LaMICtal) tablet 25 mg  25 mg Oral DAILY    leflunomide (ARAVA) tablet 20 mg  20 mg Oral DAILY    levothyroxine (SYNTHROID) tablet 175 mcg  175 mcg Oral Once per day on Mon Tue Thu Fri Sat    levothyroxine (SYNTHROID) tablet 262.5 mcg  262.5 mcg Oral every Wednesday    montelukast (SINGULAIR) tablet 10 mg  10 mg Oral QHS    pravastatin (PRAVACHOL) tablet 10 mg  10 mg Oral QHS    predniSONE (DELTASONE) tablet 10 mg  10 mg Oral DAILY    sertraline (ZOLOFT) tablet 200 mg  200 mg Oral QHS    Lisdexamfetamine (VYVANSE) capsule 70 mg- Patient's own med (Patient Supplied)  70 mg Oral DAILY     ______________________________________________________________________  EXPECTED LENGTH OF STAY: 2d 14h  ACTUAL LENGTH OF STAY:          2                 Chris Kiran MD

## 2022-09-01 NOTE — PROGRESS NOTES
Transition of Care Plan: Home with OP therapy     RUR: 7% low     PCP F/U: Dr. Conrad Boas     Disposition: Home with OP therapy     Transportation: family? Main Contact: : Lucy Swenson [phone]: CM continuing to follow for medical stability. Plan is home when medically ready. May need assistance with transportation.      Indira Cameron RN, CRM

## 2022-09-01 NOTE — PROGRESS NOTES
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 Rapid EEG was performed on admission and without epileptiform activity. Lower suspicion for seizure activity. MRI/A head and neck did not reveal acute intracranial pathology and there was noted L cervical ICA flow gap and R cervical ICA chronic dissection/pseudoaneurysm. I spoke with the patient at length at the bedside and she is concerned about her cognition once she leaves the hospital. She has had periods of confusion and she is concerned this will make her forget her medications. She is still concerned that she is having temporal lobe seizures that were not captured on previous EEG. Clinically, she seems anxious but is able to clearly describe in detail a full history of events over the last 2-3 weeks. Plan:   - MRI with contrast showed no intracranial mass or enhancing lesion.  - LP done yesterday unremarkable thus far (clear with WBC 0, gluc 49 [serum 80], culture no growth). NMDA ab, Lyme, HSV, VDRL, CMV pending. There is low suspicion for these.  - Repeat EEG yesterday negative for seizure activity. No indication for further EEG monitoring at this time.   - Please have the patient follow-up with Dr. Kristin Campoverde in Neurology outpatient since she has been followed by him previously. - The patient is very worried about driving and I explained there is no restriction on her driving but I would advise her if she is anxious or uncomfortable then she should avoid driving all together.   - Continue home Lamictal 25 mg qam and 200 mg qpm  - Continue baclofen 10 mg BID for headaches (which she states are improved)  - Have patient follow-up with outpatient Psychiatrist about Wellbutrin since she is concerned about lowering seizure threshold and if better option. There is no indication to discontinue this now. Neurology will now sign-off on the patient. Dr. Kristin Campoverde will be notified to have her follow-up appointment scheduled.     Objective:   Chart reviewed since last seen    Current Facility-Administered Medications   Medication Dose Route Frequency    enoxaparin (LOVENOX) injection 40 mg  40 mg SubCUTAneous Q24H    baclofen (LIORESAL) tablet 10 mg  10 mg Oral BID    ondansetron (ZOFRAN) injection 4 mg  4 mg IntraVENous Q6H PRN    lactulose (CHRONULAC) 10 gram/15 mL solution 15 mL  10 g Oral DAILY PRN    buPROPion XL (WELLBUTRIN XL) tablet 300 mg- Patient's own med (Patient Supplied)  300 mg Oral QHS    acetaminophen (TYLENOL) tablet 650 mg  650 mg Oral Q4H PRN    Or    acetaminophen (TYLENOL) solution 650 mg  650 mg Per NG tube Q4H PRN    Or    acetaminophen (TYLENOL) suppository 650 mg  650 mg Rectal Q4H PRN    aspirin chewable tablet 81 mg  81 mg Oral DAILY    atenoloL (TENORMIN) tablet 25 mg  25 mg Oral Q12H    fexofenadine (ALLEGRA) 180 mg tablet- Patient own med (Patient Supplied)   Oral DAILY    lamoTRIgine (LaMICtal) tablet 200 mg  200 mg Oral QHS    lamoTRIgine (LaMICtal) tablet 25 mg  25 mg Oral DAILY    leflunomide (ARAVA) tablet 20 mg  20 mg Oral DAILY    levothyroxine (SYNTHROID) tablet 175 mcg  175 mcg Oral Once per day on Mon Tue Thu Fri Sat    levothyroxine (SYNTHROID) tablet 262.5 mcg 262.5 mcg Oral every Wednesday    montelukast (SINGULAIR) tablet 10 mg  10 mg Oral QHS    pravastatin (PRAVACHOL) tablet 10 mg  10 mg Oral QHS    predniSONE (DELTASONE) tablet 10 mg  10 mg Oral DAILY    sertraline (ZOLOFT) tablet 200 mg  200 mg Oral QHS    Lisdexamfetamine (VYVANSE) capsule 70 mg- Patient's own med (Patient Supplied)  70 mg Oral DAILY       Visit Vitals  /68   Pulse 72   Temp 97.9 °F (36.6 °C)   Resp 15   Ht 5' 7\" (1.702 m)   Wt 81.9 kg (180 lb 9.6 oz)   SpO2 95%   BMI 28.29 kg/m²     Temp (24hrs), Av.1 °F (36.7 °C), Min:97.9 °F (36.6 °C), Max:98.4 °F (36.9 °C)      No intake/output data recorded.  1901 -  0700  In: -   Out: 750 [Urine:750]      Physical Exam:  General: Well developed well nourished patient in no apparent distress. Cardiac: Regular rate and rhythm with no murmurs. Extremities: 2+ Radial pulses, no cyanosis or edema    Neurological Exam:  Mental Status: Oriented to time, place and person. Some word-finding difficulty. Serial 7's intact. Cranial Nerves:   VFF, PERRL, EOMI, no nystagmus, no ptosis. Facial sensation is normal. Facial movement is symmetric. Palate is midline. Tongue is midline. Hearing is intact bilaterally. Motor:  5/5 strength in upper and lower proximal and distal muscles. Normal bulk and tone. No PD. No tremors   Reflexes:   Not assessed. Sensory:   SILT bilaterally throughout   Gait:  Not assessed. Cerebellar:  Not assessed.          Lab Review   Recent Results (from the past 24 hour(s))   CBC WITH AUTOMATED DIFF    Collection Time: 22  2:46 AM   Result Value Ref Range    WBC 13.3 (H) 3.6 - 11.0 K/uL    RBC 4.24 3.80 - 5.20 M/uL    HGB 14.1 11.5 - 16.0 g/dL    HCT 41.7 35.0 - 47.0 %    MCV 98.3 80.0 - 99.0 FL    MCH 33.3 26.0 - 34.0 PG    MCHC 33.8 30.0 - 36.5 g/dL    RDW 11.8 11.5 - 14.5 %    PLATELET 975 899 - 572 K/uL    MPV 9.6 8.9 - 12.9 FL    NRBC 0.0 0  WBC    ABSOLUTE NRBC 0.00 0.00 - 0.01 K/uL    NEUTROPHILS 58 32 - 75 %    LYMPHOCYTES 29 12 - 49 %    MONOCYTES 10 5 - 13 %    EOSINOPHILS 1 0 - 7 %    BASOPHILS 1 0 - 1 %    IMMATURE GRANULOCYTES 1 (H) 0.0 - 0.5 %    ABS. NEUTROPHILS 7.8 1.8 - 8.0 K/UL    ABS. LYMPHOCYTES 3.8 (H) 0.8 - 3.5 K/UL    ABS. MONOCYTES 1.4 (H) 0.0 - 1.0 K/UL    ABS. EOSINOPHILS 0.2 0.0 - 0.4 K/UL    ABS. BASOPHILS 0.1 0.0 - 0.1 K/UL    ABS. IMM. GRANS. 0.1 (H) 0.00 - 0.04 K/UL    DF AUTOMATED     METABOLIC PANEL, BASIC    Collection Time: 09/01/22  2:46 AM   Result Value Ref Range    Sodium 136 136 - 145 mmol/L    Potassium 3.7 3.5 - 5.1 mmol/L    Chloride 108 97 - 108 mmol/L    CO2 24 21 - 32 mmol/L    Anion gap 4 (L) 5 - 15 mmol/L    Glucose 80 65 - 100 mg/dL    BUN 12 6 - 20 MG/DL    Creatinine 0.70 0.55 - 1.02 MG/DL    BUN/Creatinine ratio 17 12 - 20      GFR est AA >60 >60 ml/min/1.73m2    GFR est non-AA >60 >60 ml/min/1.73m2    Calcium 8.8 8.5 - 10.1 MG/DL   TSH 3RD GENERATION    Collection Time: 09/01/22  2:46 AM   Result Value Ref Range    TSH 2.66 0.36 - 3.74 uIU/mL       Additional comments:  I have reviewed the patient's new clinical lab test results. I have personally reviewed the patient's radiographs. MRI Results (most recent):  Results from [place] encounter on 08/28/22    MRI BRAIN W CONT    Narrative  EXAM: MRI BRAIN W CONT    INDICATION: headaches, AMS. COMPARISON: 8/28/2020 brain MRI. CONTRAST: 18 mL of ProHance. TECHNIQUE:  Limited sequence T1-weighted multiplanar acquisition of the brain before and  after IV contrast administration to complement prior study performed one day  prior dated 8/28/2022. FINDINGS:  The ventricles are normal in size and position. There is no cerebellar  tonsillar herniation. Basal CSF cisterns are patent. No intracranial mass or  enhancing lesion. Dural venous sinuses are patent without filling defects suggest chondrosis. No intraorbital enhancing lesion or mass.  Mild diffuse paranasal sinus soft  tissue thickening/enhancement. No significant osseous or scalp lesions are  identified. Impression  No intracranial mass or enhancing lesion. Signed:JORDAN Rodrigues    I spent greater than 50% out of a 35-minute visit counseling/coordination of care with the patient.

## 2022-09-01 NOTE — PROGRESS NOTES
Spiritual Care Assessment/Progress Note  [facility]      NAME: Kelsy Herron      MRN: [id number]  AGE: 42 y.o.  SEX: female  Yazidi Affiliation: No preference   Language: English     9/1/2022     Total Time (in minutes): 28     Spiritual Assessment begun in [address] through conversation with:         [x]Patient        [] Family    [] Friend(s)        Reason for Consult: Initial/Spiritual assessment, patient floor     Spiritual beliefs: (Please include comment if needed)     [] Identifies with a yeni tradition:         [] Supported by a yeni community:            [] Claims no spiritual orientation:           [] Seeking spiritual identity:                [] Adheres to an individual form of spirituality:           [x] Not able to assess:                           Identified resources for coping:      [] Prayer                               [] Music                  [] Guided Imagery     [] Family/friends                 [] Pet visits     [] Devotional reading                         [] Unknown     [x] Other: Personal resilience                                              Interventions offered during this visit: (See comments for more details)    Patient Interventions: Initial visit, Affirmation of emotions/emotional suffering           Plan of Care:     [] Support spiritual and/or cultural needs    [] Support AMD and/or advance care planning process      [] Support grieving process   [] Coordinate Rites and/or Rituals    [] Coordination with community clergy   [] No spiritual needs identified at this time   [] Detailed Plan of Care below (See Comments)  [] Make referral to Music Therapy  [] Make referral to Pet Therapy     [] Make referral to Addiction services  [] Make referral to [facility]  [] Make referral to Spiritual Care Partner  [] No future visits requested        [] Contact Spiritual Care for further referrals     Comments:  visit as a result of Kelsy Herron length of stay. I reviewed the patient's chart prior to this encounter. I initiated a relationship of care and concern; explored their spiritual needs, distress, and coping strategies. Assessment: Coping through self-avocation    We discussed their spirituality and feelings/concerns. Ms. Heidy Hoyt shared her medical journey leading to this admission. I offered active listening, words of encouragement and blessing for continued healing. Ronan Cabrera expressed appreciation for my care and information about our services. Please contact Spiritual Care services for any additional needs ([phone])    _________________________________________  Rev.  Mile Prajapati, Staff Ab Sanchez, MS, [facility]

## 2022-09-02 VITALS
HEART RATE: 74 BPM | BODY MASS INDEX: 27.94 KG/M2 | DIASTOLIC BLOOD PRESSURE: 62 MMHG | RESPIRATION RATE: 12 BRPM | WEIGHT: 178 LBS | TEMPERATURE: 97.9 F | SYSTOLIC BLOOD PRESSURE: 110 MMHG | HEIGHT: 67 IN | OXYGEN SATURATION: 98 %

## 2022-09-02 LAB
ANION GAP SERPL CALC-SCNC: 3 MMOL/L (ref 5–15)
BUN SERPL-MCNC: 10 MG/DL (ref 6–20)
BUN/CREAT SERPL: 15 (ref 12–20)
CALCIUM SERPL-MCNC: 9.1 MG/DL (ref 8.5–10.1)
CHLORIDE SERPL-SCNC: 107 MMOL/L (ref 97–108)
CO2 SERPL-SCNC: 29 MMOL/L (ref 21–32)
CREAT SERPL-MCNC: 0.66 MG/DL (ref 0.55–1.02)
GLUCOSE SERPL-MCNC: 82 MG/DL (ref 65–100)
MAGNESIUM SERPL-MCNC: 2.1 MG/DL (ref 1.6–2.4)
POTASSIUM SERPL-SCNC: 3.3 MMOL/L (ref 3.5–5.1)
SODIUM SERPL-SCNC: 139 MMOL/L (ref 136–145)
VIT B12 SERPL-MCNC: 963 PG/ML (ref 193–986)

## 2022-09-02 PROCEDURE — 74011250637 HC RX REV CODE- 250/637: Performed by: HOSPITALIST

## 2022-09-02 PROCEDURE — 80048 BASIC METABOLIC PNL TOTAL CA: CPT

## 2022-09-02 PROCEDURE — 99232 SBSQ HOSP IP/OBS MODERATE 35: CPT | Performed by: NURSE PRACTITIONER

## 2022-09-02 PROCEDURE — 95718 EEG PHYS/QHP 2-12 HR W/VEEG: CPT | Performed by: PSYCHIATRY & NEUROLOGY

## 2022-09-02 PROCEDURE — 74011250637 HC RX REV CODE- 250/637: Performed by: INTERNAL MEDICINE

## 2022-09-02 PROCEDURE — 82607 VITAMIN B-12: CPT

## 2022-09-02 PROCEDURE — 74011636637 HC RX REV CODE- 636/637: Performed by: HOSPITALIST

## 2022-09-02 PROCEDURE — 83735 ASSAY OF MAGNESIUM: CPT

## 2022-09-02 PROCEDURE — 36415 COLL VENOUS BLD VENIPUNCTURE: CPT

## 2022-09-02 RX ADMIN — LEFLUNOMIDE 20 MG: 10 TABLET ORAL at 11:55

## 2022-09-02 RX ADMIN — ATENOLOL 25 MG: 25 TABLET ORAL at 11:56

## 2022-09-02 RX ADMIN — BACLOFEN 10 MG: 10 TABLET ORAL at 19:09

## 2022-09-02 RX ADMIN — BACLOFEN 10 MG: 10 TABLET ORAL at 11:56

## 2022-09-02 RX ADMIN — ASPIRIN 81 MG CHEWABLE TABLET 81 MG: 81 TABLET CHEWABLE at 11:57

## 2022-09-02 RX ADMIN — LAMOTRIGINE 25 MG: 25 TABLET ORAL at 11:56

## 2022-09-02 RX ADMIN — Medication 180 MG: at 14:58

## 2022-09-02 RX ADMIN — LEVOTHYROXINE SODIUM 175 MCG: 0.15 TABLET ORAL at 07:08

## 2022-09-02 RX ADMIN — PREDNISONE 10 MG: 10 TABLET ORAL at 11:57

## 2022-09-02 NOTE — PROGRESS NOTES
Patient is walking around the unit, gait steady with her phone in her hand while she is looking at it. She understands that she will have to stay in bed once the EEG has been placed and has agreed to a pure wick over night.   EEG tech has been contacted and per our conversation, she will be up on the unit as soon as she is able

## 2022-09-02 NOTE — DISCHARGE SUMMARY
Physician Discharge Summary     Patient ID:    Maria Dolores Butts  [id number]  42 y.o.  1975    Admit date: 8/28/2022    Discharge date and time: 9/2/2022 2:13 PM    Admission HPI:  HPI: Cecilia Mckinnon is a 42 y.o. female who presents with confusion   72-year-old female, works as a neuro and psych NP, has extensive neurological condition including carotic artery dissection in the past asthma, depression, hypertension. Pt arrives for intermittent confusion, periods of inability to perform known tasks and not recognizing known places. These symptoms have been persistent x 2 weeks, she was seen at [facility] ED on Friday, had neuro workup and eval to include CTA head and neck and neurologist ehsan and dc'd with plan to follow up this week with neuro but now has concern for seizure activity due to presenting symptoms. Pt appears very anxious and has scattered thought processes with difficulty explaining symptoms. A&OX4, GCS 15. Denies any recent trauma or injuries. Telemetry neurology eval would recommend admission for possible focal seizures, also recommend a brain MRI, MRA\"      Hospital Diagnoses and Treatment Rendered:   Acute encephalopathy/confusion/memory lapses x2 weeks  Pt gets all her care at [facility] and wishes to get her care at [facility] now  -MRI/A head and neck did not reveal acute intracranial pathology and there was noted L cervical ICA flow gap and R cervical ICA chronic dissection/pseudoaneurysm  -rapid EEG on admit was wnl  -2hr vEEG wnl  -s/p LP, unremarkable thus far  -12hr vEEG again wnl  -Per neurology, there is no restriction on her driving but I would advise her if she is anxious or uncomfortable then she should avoid driving all together. Continue home Lamictal 25 mg qam and 200 mg qpm. Continue baclofen 10 mg BID for headaches (which she states are improved).  Have patient follow-up with outpatient Psychiatrist about Wellbutrin since she is concerned about lowering seizure threshold and if better option. There is no indication to discontinue this now. Dr. Júnior Avendaño was notified to have her follow-up as outpatient. She also needs close Psychiatry follow-up. [id number] [address] for discharge     History of depression/anxiety  -pt states she has narcolepsy as well, on stimulants  -psych consulted, Patient appears to be stable and is not at risk to harm self or others at this time. Psych admission is not indicated and patient can be discharged when medically cleared     Hypothyroidism  -on replacement tx, TSH wnl     Hypertension, controlled  -cont meds      Chronic Diagnoses:    Problem List as of 9/2/2022 Date Reviewed: 3/1/2021            Codes Class Noted - Resolved    Pseudoaneurysm ([facility] ICD-10-CM: I72.9  ICD-9-CM: 442.9  8/30/2022 - Present        Altered mental status ICD-10-CM: R41.82  ICD-9-CM: 780.97  8/30/2022 - Present        Carotid artery disease ([facility] ICD-10-CM: I77.9  ICD-9-CM: 447.9  8/30/2022 - Present        AMS (altered mental status) ICD-10-CM: R41.82  ICD-9-CM: 780.97  8/28/2022 - Present        Major depressive disorder ICD-10-CM: F32.9  ICD-9-CM: 296.20  3/1/2021 - Present        Anxiety ICD-10-CM: F41.9  ICD-9-CM: 300.00  3/1/2021 - Present        Allergic rhinitis ICD-10-CM: J30.9  ICD-9-CM: 477.9  3/1/2021 - Present        Sleep apnea ICD-10-CM: G47.30  ICD-9-CM: 780.57  3/1/2021 - Present        Disorder of carotid artery ([facility] ICD-10-CM: I77.9  ICD-9-CM: 447.9  3/1/2021 - Present        Retinal artery occlusion ICD-10-CM: H34.9  ICD-9-CM: 362.30  3/31/2016 - Present        Heart palpitations ICD-10-CM: R00.2  ICD-9-CM: 785.1  10/27/2010 - Present        Essential hypertension, benign ICD-10-CM: I10  ICD-9-CM: 401.1  10/27/2010 - Present           Discharge Medications:   Current Discharge Medication List        CONTINUE these medications which have NOT CHANGED    Details   !! sodium,calcium,mag,pot oxybate (Xywav) 0.5 gram/mL soln Take 3,500 mg by mouth every evening.  Additional dose to be taken 2.5 to 4 hours after first dose of 2,750 mg.      cholecalciferol (VITAMIN D3) (5000 Units/125 mcg) tab tablet Take 5,000 Units by mouth five (5) days a week. !! lamoTRIgine (LaMICtal) 25 mg tablet Take 25 mg by mouth daily. (25 mg in the morning; 200 mg in the evening)      !! levothyroxine (SYNTHROID) 175 mcg tablet Take 175 mcg by mouth five (5) days a week. (175 mcg x 5 days; 350 mcg on Sunday; 262.5 mcg on Wednesday)      !! levothyroxine (SYNTHROID) 175 mcg tablet Take 350 mcg by mouth every Sunday. (175 mcg x 5 days; 350 mcg on Sunday; 262.5 mcg on Wednesday)      !! levothyroxine (SYNTHROID) 175 mcg tablet Take 262.5 mcg by mouth every Wednesday. (175 mcg x 5 days; 350 mcg on Sunday; 262.5 mcg on Wednesday)      acetaminophen (TYLENOL) 500 mg tablet Take 1,000 mg by mouth four (4) times daily as needed for Pain (headache). therapeutic multivitamin (Thera) tablet Take 1 Tablet by mouth daily. atenoloL (TENORMIN) 25 mg tablet Take 25 mg by mouth every twelve (12) hours. baclofen (LIORESAL) 10 mg tablet Take 10 mg by mouth every twelve (12) hours. leflunomide (ARAVA) 20 mg tablet Take 20 mg by mouth daily. montelukast (SINGULAIR) 10 mg tablet Take 10 mg by mouth nightly. pravastatin (PRAVACHOL) 10 mg tablet Take 10 mg by mouth nightly. predniSONE (DELTASONE) 5 mg tablet Take 10 mg by mouth daily. promethazine (PHENERGAN) 12.5 mg tablet Take 12.5 mg by mouth every six (6) hours as needed for Nausea. semaglutide (Ozempic) 1 mg/dose (2 mg/1.5 mL) sub-q pen 0.25 mg by SubCUTAneous route every other day. !! sodium,calcium,mag,pot oxybate (Xywav) 0.5 gram/mL soln Take 2,750 mg by mouth nightly. (dose = 2750 mg = 5.5 mL)      CAFFEINE PO Take 1,000 mg by mouth four (4) times daily as needed.  ([employer])      levalbuterol tartrate (XOPENEX) 45 mcg/actuation inhaler Take 2 Puffs by inhalation every four (4) hours as needed for Wheezing or Shortness of Breath. Lisdexamfetamine (VYVANSE) 70 mg cap Take 70 mg by mouth daily. !! lamoTRIgine (LaMICtal) 100 mg tablet Take 200 mg by mouth nightly. (25 mg in the morning; 200 mg in the evening)      sertraline (ZOLOFT) 100 mg tablet Take 200 mg by mouth nightly. buPROPion XL (WELLBUTRIN XL) 300 mg XL tablet Take 300 mg by mouth nightly. aspirin delayed-release 81 mg tablet Take 81 mg by mouth daily. Indications: carotid artery disection with stent  Refills: 6      adalimumab (HUMRIA) 40 mg/0.8 mL injection 40 mg by SubCUTAneous route every Tuesday. fexofenadine (ALLEGRA) 180 mg tablet Take 180 mg by mouth every twelve (12) hours. !! - Potential duplicate medications found. Please discuss with provider. Follow up Care:    Fidelina Ortiz MD in 1-2 weeks. Please call to set up an appointment shortly after discharge. Follow-up Information       Follow up With Specialties Details Why Contact Info    Fidelina Ortiz MD Internal Medicine Physician   [address] [place] [address]  [phone]              Diet: ADULT DIET Regular    Disposition:  Home    Code Status: Full Code    Discharge Exam:  General:  Alert, cooperative, no distress  Back:    Symmetric, ROM normal  Lungs:   Clear to auscultation bilaterally, symmetric expansion, no respiratory distress  Chest wall:  No tenderness or deformity  Heart:   Regular rate and rhythm, no audible murmur  Abdomen:   Soft, non-tender, non-distended  Extremities: Extremities normal, atraumatic, no cyanosis or edema  Skin:  Skin color, texture, turgor normal. No rashes or lesions  Neurologic: CNII-XII intact. SARAH. A&Ox3. Gives examples of her memory lapses that have occurred. States she does not recall speaking with the neurology team yesterday.     CONSULTATIONS: IP CONSULT TO NEUROLOGY  IP CONSULT TO PSYCHIATRY  IP CONSULT TO PSYCHIATRY    Significant Diagnostic Studies:   8/28/2022: BUN 11 MG/DL (Ref range: 6 - 20 MG/DL); Calcium 9.1 MG/DL (Ref range: 8.5 - 10.1 MG/DL); CO2 30 mmol/L (Ref range: 21 - 32 mmol/L); Creatinine 0.70 MG/DL (Ref range: 0.55 - 1.02 MG/DL); Glucose 89 mg/dL (Ref range: 65 - 100 mg/dL); HCT 41.5 % (Ref range: 35.0 - 47.0 %); HGB 14.5 g/dL (Ref range: 11.5 - 16.0 g/dL); Potassium 4.1 mmol/L (Ref range: 3.5 - 5.1 mmol/L); Sodium 137 mmol/L (Ref range: 136 - 145 mmol/L)  Recent Labs     09/01/22  0246   WBC 13.3*   HGB 14.1   HCT 41.7        Recent Labs     09/02/22  0553 09/01/22  0246    136   K 3.3* 3.7    108   CO2 29 24   BUN 10 12   CREA 0.66 0.70   GLU 82 80   CA 9.1 8.8   MG 2.1  --      No results for input(s): AP, TBIL, TP, ALB, GLOB, GGT, AML, LPSE in the last 72 hours. No lab exists for component: SGOT, GPT, AMYP, HLPSE  No results for input(s): INR, PTP, APTT, INREXT, INREXT in the last 72 hours. No results for input(s): FE, TIBC, PSAT, FERR in the last 72 hours. No results for input(s): PH, PCO2, PO2 in the last 72 hours. No results for input(s): CPK, CKMB in the last 72 hours.     No lab exists for component: TROPONINI  No components found for: [place]    Greater than 30 minutes were spent with the patient on counseling and coordination of care    Signed:  Alma Andrews MD  9/2/2022  2:13 PM

## 2022-09-02 NOTE — PROGRESS NOTES
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 Rapid EEG was performed on admission and without epileptiform activity. Lower suspicion for seizure activity. MRI/A head and neck did not reveal acute intracranial pathology and there was noted L cervical ICA flow gap and R cervical ICA chronic dissection/pseudoaneurysm. 12h EEG done overnight negative for seizure activity    Plan:  - MRI with contrast showed no intracranial mass or enhancing lesion.  - LP done yesterday unremarkable thus far (clear with WBC 0, gluc 49 [serum 80], culture no growth). VDRL and HSV negative, NMDA ab, Lyme, CMV pending. There is low suspicion for these. - Please have the patient follow-up with Dr. Easton Rodriguez in Neurology outpatient since she has been followed by him previously. I notified him via PerfectServe and he responded he would follow-up with her in clinic but that she also needs close Psychiatry follow-up.   - The patient is very worried about driving and I explained there is no restriction on her driving but I would advise her if she is anxious or uncomfortable then she should avoid driving all together.   - Continue home Lamictal 25 mg qam and 200 mg qpm  - Continue baclofen 10 mg BID for headaches (which she states are improved)  - Have patient follow-up with outpatient Psychiatrist (as stated above) about Wellbutrin since she is concerned about lowering seizure threshold and if better option. There is no indication to discontinue this now. From our standpoint, patient can be discharged today. I discussed her case with Dr. Luis Alberto Arguello (Hospitalist), Dr. Sol Leiva (Neurology) and updated Dr. Ihsan Parsons (outpatient Neurology) as above.      Objective:   Chart reviewed since last seen    Current Facility-Administered Medications   Medication Dose Route Frequency    enoxaparin (LOVENOX) injection 40 mg  40 mg SubCUTAneous Q24H    baclofen (LIORESAL) tablet 10 mg  10 mg Oral BID    ondansetron (ZOFRAN) injection 4 mg  4 mg IntraVENous Q6H PRN    lactulose (CHRONULAC) 10 gram/15 mL solution 15 mL  10 g Oral DAILY PRN    buPROPion XL (WELLBUTRIN XL) tablet 300 mg- Patient's own med (Patient Supplied)  300 mg Oral QHS    acetaminophen (TYLENOL) tablet 650 mg  650 mg Oral Q4H PRN    Or    acetaminophen (TYLENOL) solution 650 mg  650 mg Per NG tube Q4H PRN    Or    acetaminophen (TYLENOL) suppository 650 mg  650 mg Rectal Q4H PRN    aspirin chewable tablet 81 mg  81 mg Oral DAILY    atenoloL (TENORMIN) tablet 25 mg  25 mg Oral Q12H    fexofenadine (ALLEGRA) 180 mg tablet- Patient own med (Patient Supplied)   Oral DAILY    lamoTRIgine (LaMICtal) tablet 200 mg  200 mg Oral QHS    lamoTRIgine (LaMICtal) tablet 25 mg  25 mg Oral DAILY    leflunomide (ARAVA) tablet 20 mg  20 mg Oral DAILY    levothyroxine (SYNTHROID) tablet 175 mcg  175 mcg Oral Once per day on Mon Tue Thu Fri Sat    levothyroxine (SYNTHROID) tablet 262.5 mcg  262.5 mcg Oral every Wednesday    montelukast (SINGULAIR) tablet 10 mg  10 mg Oral QHS    pravastatin (PRAVACHOL) tablet 10 mg  10 mg Oral QHS    predniSONE (DELTASONE) tablet 10 mg  10 mg Oral DAILY    sertraline (ZOLOFT) tablet 200 mg  200 mg Oral QHS Lisdexamfetamine (VYVANSE) capsule 70 mg- Patient's own med (Patient Supplied)  70 mg Oral DAILY       Visit Vitals  /63 (BP 1 Location: Right upper arm, BP Patient Position: At rest)   Pulse 62   Temp 98 °F (36.7 °C)   Resp 15   Ht 5' 7\" (1.702 m)   Wt 80.7 kg (178 lb)   SpO2 98%   BMI 27.88 kg/m²     Temp (24hrs), Av.1 °F (36.7 °C), Min:97.1 °F (36.2 °C), Max:99 °F (37.2 °C)      No intake/output data recorded. No intake/output data recorded. Physical Exam:  General: Well developed well nourished patient in no apparent distress. Cardiac: Regular rate and rhythm with no murmurs. Extremities: 2+ Radial pulses, no cyanosis or edema    Neurological Exam:  Mental Status: Oriented to time, place and person. Some word-finding difficulty. Serial 7's intact. Cranial Nerves:   VFF, PERRL, EOMI, no nystagmus, no ptosis. Facial sensation is normal. Facial movement is symmetric. Palate is midline. Tongue is midline. Hearing is intact bilaterally. Motor:  5/5 strength in upper and lower proximal and distal muscles. Normal bulk and tone. No PD. No tremors   Reflexes:   Not assessed. Sensory:   SILT bilaterally throughout   Gait:  Not assessed. Cerebellar:  Not assessed.          Lab Review   Recent Results (from the past 24 hour(s))   MAGNESIUM    Collection Time: 22  5:53 AM   Result Value Ref Range    Magnesium 2.1 1.6 - 2.4 mg/dL   VITAMIN B12    Collection Time: 22  5:53 AM   Result Value Ref Range    Vitamin B12 963 193 - 888 pg/mL   METABOLIC PANEL, BASIC    Collection Time: 22  5:53 AM   Result Value Ref Range    Sodium 139 136 - 145 mmol/L    Potassium 3.3 (L) 3.5 - 5.1 mmol/L    Chloride 107 97 - 108 mmol/L    CO2 29 21 - 32 mmol/L    Anion gap 3 (L) 5 - 15 mmol/L    Glucose 82 65 - 100 mg/dL    BUN 10 6 - 20 MG/DL    Creatinine 0.66 0.55 - 1.02 MG/DL    BUN/Creatinine ratio 15 12 - 20      GFR est AA >60 >60 ml/min/1.73m2    GFR est non-AA >60 >60 ml/min/1.73m2    Calcium 9.1 8.5 - 10.1 MG/DL       Additional comments:  I have reviewed the patient's new clinical lab test results. There is no further imaging to review.       JORDAN Blackburn

## 2022-09-02 NOTE — PROGRESS NOTES
Transition of Care Plan: Home with OP therapy     RUR: 7% low     PCP F/U: Dr. Rosenberg Meals     Disposition: Home with OP therapy     Transportation: [facility]-patient to schedule     Main Contact: : Brian HUTCHISON-[phone]    1607: Provided rx for OP therapy to patient. Provided personal caregiver list. Patient to [address] [facility] when she is ready to be discharged. Confirms she can pay and has [employer] le[facility]ardo on phone. Stephanie Qureshi RN, CRM    The program assesses the family and/or caregiver's readiness, willingness, and ability to provide or support and self-management activities for the patient as needed.

## 2022-09-02 NOTE — PROCEDURES
ELECTROENCEPHALOGRAM REPORT     Patient Name: Ginger Gilmore  : 1975  Age: 42 y.o. Ordering physician: Nikkie Hagen NP  Date of EEG: 2022 at [address] through [address] [place] [address]  Interpreting physician: Ruben Monk MD    PROCEDURE: 2-12 hour video electroencephalogram (vEEG). CLINICAL INDICATION: The patient is a 42 y.o. female who is being evaluated for evidence of focal epilepsy, subclinical seizure. DESCRIPTION OF THE RECORD:   During wakefulness, there is a well-formed posterior dominant alpha rhythm composed of 9 to 10 Hz alpha with preserved and symmetric anterior to posterior frequency/amplitude gradient. No interpose frequencies or focal slowing is noted. Transition of drowsiness is manifest by fragmentation of the waking background progressive increase in diffuse theta. N1 sleep is demarcated by emergence of vertex waves, N2 by transient K complexes appreciated. No epileptiform discharges, electrographic seizures or lateralized patterns are appreciated. Reactivity is present to environmental stimuli. Single-lead ECG demonstrates normal sinus rhythm. INTERPRETATION:     This is a normal awake, drowsy and asleep 2-12 hour vEEG. Clinical correlation is advised.       Elenita Puente MD

## 2022-09-03 LAB — NMDA RECEPTOR, CSF: NORMAL

## 2022-09-03 NOTE — PROGRESS NOTES
Physician Progress Note      PATIENT:               Jacobo Cole  CSN #:                  [id number]  :                       1975  ADMIT DATE:       2022 4:15 PM  [address] [place] DATE:  RESPONDING  PROVIDER #:        Deja Jay MD        QUERY TEXT:    Type of Encephalopathy: Please provide further specificity, if known. Clinical indicators include: acute, encephalopathy, altered mental status, ams, lactulose, chronulac  Options provided:  -- Anoxic/hypoxic encephalopathy  -- Metabolic encephalopathy  -- Toxic encephalopathy  -- Hepatic encephalopathy  -- Hypertensive encephalopathy  -- Other - I will add my own diagnosis  -- Disagree - Not applicable / Not valid  -- Disagree - Clinically Unable to determine / Unknown        PROVIDER RESPONSE TEXT:    The patient has metabolic encephalopathy.       Electronically signed by:  Deja Jay MD 2022 9:42 PM

## 2022-09-06 ENCOUNTER — TELEPHONE (OUTPATIENT)
Dept: NEUROLOGY | Age: 47
End: 2022-09-06

## 2022-09-07 LAB
BACTERIA SPEC CULT: NORMAL
GRAM STN SPEC: NORMAL
GRAM STN SPEC: NORMAL
SERVICE CMNT-IMP: NORMAL

## 2022-09-09 LAB
B BURGDOR IGG PATRN CSF IB-IMP: NEGATIVE
B BURGDOR IGM PATRN CSF IB-IMP: NEGATIVE
B BURGDOR18KD IGG CSF QL IB: NORMAL
B BURGDOR23KD IGG CSF QL IB: NORMAL
B BURGDOR23KD IGM CSF QL IB: NORMAL
B BURGDOR28KD IGG CSF QL IB: NORMAL
B BURGDOR30KD IGG CSF QL IB: NORMAL
B BURGDOR39KD IGG CSF QL IB: NORMAL
B BURGDOR39KD IGM CSF QL IB: NORMAL
B BURGDOR41KD IGG CSF QL IB: NORMAL
B BURGDOR41KD IGM CSF QL IB: NORMAL
B BURGDOR45KD IGG CSF QL IB: NORMAL
B BURGDOR58KD IGG CSF QL IB: NORMAL
B BURGDOR66KD IGG CSF QL IB: NORMAL
B BURGDOR93KD IGG CSF QL IB: NORMAL

## 2022-09-15 NOTE — PROGRESS NOTES
Physician Progress Note      PATIENT:               Shruthi Loza  CSN #:                  [id number]  :                       1975  ADMIT DATE:       2022 4:15 PM  [address] DATE:        2022 10:56 PM  RESPONDING  PROVIDER #:        Tracy Allen MD          QUERY TEXT:    Pt admitted with AMS/confusion  If possible, please document in progress notes and discharge summary the relationship, if any, between AMS/confusion  and AMS/confusion /or pseudoaneurysm. The medical record reflects the following:  Risk Factors: AMS/confusion    Clinical Indicators:    Patient presents for evaluation of confusion, forgetfulness, trouble with her speech clumsiness  multiple other complaints,ED noted as Patient with multiple complaints including but is probably  complex headaches, trouble with word finding, some focal shaking episodes, confusion, auditory  illusions here and there. H&P noted as Confusion: Etiology unclear, will check EEG, brain MRI,  brain MRA and neck MRA. If notice any stroke, then may need further stroke work-up including  echo. MD notes noted as Acute encephalopathy/confusion/memory lapses x2 weeks. MRI/A head  and neck did not reveal acute intracranial pathology and there was noted L cervical ICA flow gap  and R cervical ICA chronic dissection/pseudoaneurysm    Treatment:  EEG, brain MRI, brain MRA and neck MRA  ECHO  Neuro consult  psych consult    Thank you,  Kar Paz RN [facility]  Clinical Documentation  [phone] or via Perfect Serve  Options provided:  -- AMS/confusion due to metabolic encephalopathy  -- AMS/confusion due to pseudoaneurysm  -- AMS/confusion due to other specified condition  -- Other - I will add my own diagnosis  -- Disagree - Not applicable / Not valid  -- Disagree - Clinically unable to determine / Unknown  -- Refer to Clinical Documentation Reviewer    PROVIDER RESPONSE TEXT:    Provider is clinically unable to determine a response to this query.   unable to determine    Query created by: Vickey Duane on 9/7/2022 7:21 AM      Electronically signed by:  Kirk Sol MD 9/15/2022 7:12 AM

## 2023-05-20 ENCOUNTER — HOSPITAL ENCOUNTER (EMERGENCY)
Facility: HOSPITAL | Age: 48
Discharge: HOME | End: 2023-05-20
Attending: EMERGENCY MEDICINE

## 2023-05-20 VITALS
SYSTOLIC BLOOD PRESSURE: 121 MMHG | RESPIRATION RATE: 16 BRPM | HEIGHT: 67 IN | HEART RATE: 76 BPM | BODY MASS INDEX: 35.19 KG/M2 | OXYGEN SATURATION: 98 % | DIASTOLIC BLOOD PRESSURE: 82 MMHG | WEIGHT: 224.21 LBS | TEMPERATURE: 98.5 F

## 2023-05-20 DIAGNOSIS — R42 DIZZINESS AND GIDDINESS: Primary | ICD-10-CM

## 2023-05-20 LAB
ALBUMIN SERPL-MCNC: 3.8 G/DL (ref 3.5–5)
ALBUMIN/GLOB SERPL: 1.1 (ref 1.1–2.2)
ALP SERPL-CCNC: 76 U/L (ref 45–117)
ALT SERPL-CCNC: 22 U/L (ref 12–78)
ANION GAP SERPL CALC-SCNC: 9 MMOL/L (ref 5–15)
AST SERPL-CCNC: 22 U/L (ref 15–37)
BILIRUB SERPL-MCNC: 0.3 MG/DL (ref 0.2–1)
BUN SERPL-MCNC: 8 MG/DL (ref 6–20)
BUN/CREAT SERPL: 9 (ref 12–20)
CALCIUM SERPL-MCNC: 8.8 MG/DL (ref 8.5–10.1)
CHLORIDE SERPL-SCNC: 106 MMOL/L (ref 97–108)
CO2 SERPL-SCNC: 24 MMOL/L (ref 21–32)
COMMENT:: NORMAL
CREAT SERPL-MCNC: 0.85 MG/DL (ref 0.55–1.02)
ERYTHROCYTE [DISTWIDTH] IN BLOOD BY AUTOMATED COUNT: 13.5 % (ref 11.5–14.5)
GLOBULIN SER CALC-MCNC: 3.4 G/DL (ref 2–4)
GLUCOSE SERPL-MCNC: 107 MG/DL (ref 65–100)
HCT VFR BLD AUTO: 37.7 % (ref 35–47)
HGB BLD-MCNC: 12.2 G/DL (ref 11.5–16)
MCH RBC QN AUTO: 35.2 PG (ref 26–34)
MCHC RBC AUTO-ENTMCNC: 32.4 G/DL (ref 30–36.5)
MCV RBC AUTO: 108.6 FL (ref 80–99)
NRBC # BLD: 0 K/UL (ref 0–0.01)
NRBC BLD-RTO: 0 PER 100 WBC
PLATELET # BLD AUTO: 220 K/UL (ref 150–400)
PMV BLD AUTO: 10.8 FL (ref 8.9–12.9)
POTASSIUM SERPL-SCNC: 3.8 MMOL/L (ref 3.5–5.1)
PROT SERPL-MCNC: 7.2 G/DL (ref 6.4–8.2)
RBC # BLD AUTO: 3.47 M/UL (ref 3.8–5.2)
SODIUM SERPL-SCNC: 139 MMOL/L (ref 136–145)
SPECIMEN HOLD: NORMAL
WBC # BLD AUTO: 4.7 K/UL (ref 3.6–11)

## 2023-05-20 PROCEDURE — 99283 EMERGENCY DEPT VISIT LOW MDM: CPT

## 2023-05-20 PROCEDURE — 85027 COMPLETE CBC AUTOMATED: CPT

## 2023-05-20 PROCEDURE — 36415 COLL VENOUS BLD VENIPUNCTURE: CPT

## 2023-05-20 PROCEDURE — 80053 COMPREHEN METABOLIC PANEL: CPT

## 2023-05-20 ASSESSMENT — PAIN - FUNCTIONAL ASSESSMENT: PAIN_FUNCTIONAL_ASSESSMENT: 0-10

## 2023-05-20 ASSESSMENT — ENCOUNTER SYMPTOMS
SHORTNESS OF BREATH: 0
CONSTIPATION: 0
COLOR CHANGE: 0
VOMITING: 0
NAUSEA: 1
ABDOMINAL PAIN: 0
DIARRHEA: 0
BACK PAIN: 0

## 2023-05-20 ASSESSMENT — PAIN DESCRIPTION - LOCATION: LOCATION: HEAD

## 2023-05-20 ASSESSMENT — PAIN SCALES - GENERAL: PAINLEVEL_OUTOF10: 3

## 2023-05-20 NOTE — ED NOTES
Patient discharged. RN reviewed discharge instructions with patient. Patient verbalized understanding.       Kaden Burroughs RN  05/20/23 9743

## 2023-05-20 NOTE — ED NOTES
[facility] EMERGENCY DEP  EMERGENCY DEPARTMENT ENCOUNTER      Pt Name: David Castellanos  MRN: [id number]  Armsyadiragfrick 1975  Date of evaluation: 5/20/2023  Provider: Ez Johnson MD    [address] [place] [address]       Chief Complaint   Patient presents with    Abnormal Lab         HISTORY OF PRESENT ILLNESS   (Location/Symptom, Timing/Onset, Context/Setting, Quality, Duration, Modifying Factors, Severity)  Note limiting factors. The history is provided by the patient and the spouse. No  was used. Dizziness  Quality:  Lightheadedness  Severity:  Moderate  Onset quality:  Gradual  Timing:  Constant  Progression:  Unchanged  Chronicity:  Chronic  Relieved by:  Nothing  Worsened by:  Nothing  Ineffective treatments:  None tried  Associated symptoms: nausea    Associated symptoms: no chest pain, no diarrhea, no headaches, no palpitations, no shortness of breath, no vomiting and no weakness        Review of External Medical Records:     Nursing Notes were reviewed. REVIEW OF SYSTEMS    (2-9 systems for level 4, 10 or more for level 5)     Review of Systems   Constitutional:  Negative for activity change, chills and fever. HENT:  Negative for nosebleeds. Eyes:  Negative for visual disturbance. Respiratory:  Negative for shortness of breath. Cardiovascular:  Negative for chest pain and palpitations. Gastrointestinal:  Positive for nausea. Negative for abdominal pain, constipation, diarrhea and vomiting. Genitourinary:  Negative for difficulty urinating, dysuria, hematuria and urgency. Musculoskeletal:  Negative for back pain, neck pain and neck stiffness. Skin:  Negative for color change. Allergic/Immunologic: Negative for immunocompromised state. Neurological:  Positive for dizziness. Negative for seizures, syncope, weakness, light-headedness, numbness and headaches. Psychiatric/Behavioral:  Negative for behavioral problems, confusion, hallucinations, self-injury and suicidal ideas.

## 2023-05-20 NOTE — ED TRIAGE NOTES
Patient arrives with a CC of abnormal CTA of her head. Patient stated the CTA was taken yesterday and she reviewed the CD of the CTA and is concerned for obstructed blood flow. Patient stated she has a hx of dissections. Patient takes ASA 81mg.

## 2024-04-26 DIAGNOSIS — I77.71 CAROTID ARTERY DISSECTION (MULTI): ICD-10-CM

## 2024-05-01 ENCOUNTER — APPOINTMENT (OUTPATIENT)
Dept: VASCULAR MEDICINE | Facility: HOSPITAL | Age: 49
End: 2024-05-01
Payer: COMMERCIAL

## 2024-05-02 ENCOUNTER — APPOINTMENT (OUTPATIENT)
Dept: CARDIOLOGY | Facility: HOSPITAL | Age: 49
End: 2024-05-02
Payer: COMMERCIAL

## 2024-09-05 ENCOUNTER — APPOINTMENT (OUTPATIENT)
Dept: CARDIOLOGY | Facility: HOSPITAL | Age: 49
End: 2024-09-05
Payer: COMMERCIAL

## 2024-09-05 ENCOUNTER — APPOINTMENT (OUTPATIENT)
Dept: VASCULAR MEDICINE | Facility: HOSPITAL | Age: 49
End: 2024-09-05
Payer: COMMERCIAL

## 2025-06-14 NOTE — H&P
[address] [facility] Adult  Hospitalist Group  History and Physical    Date of Service:  8/28/2022  Primary Care Provider: Rosita Jaramillo MD  Source of information: The patient    Chief Complaint: Altered mental status      History of Presenting Illness:   Phi Mahoney is a 42 y.o. 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 Pt arrives for intermittent confusion, periods of inability to perform known tasks and not recognizing known places. These symptoms have been persistent x 2 weeks, she was seen at [facility] ED on Friday, had neuro workup and eval to include CTA head and neck and neurologist ehsan and dc'd with plan to follow up this week with neuro but now has concern for seizure activity due to presenting symptoms. Pt appears very anxious and has scattered thought processes with difficulty explaining symptoms. A&OX4, GCS 15. Denies any recent trauma or injuries. .  Telemetry neurology eval would recommend admission for possible focal seizures, also recommend a brain MRI, MRA         REVIEW OF SYSTEMS:  General: HPI, no changes of weight  HEENT: Intermittent headache , neck pain, no vision changes, no nose discharge, no hearing changes   RES: no wheezing, no cough, no sob  CVS: no cp, no palpitation.   Muscular: no joint swelling, no muscle pain, no leg swelling  Skin: no rash, no itching   GI: no vomiting, no diarrhea  : no dysuria, no hematuria  Hemo: no gum bleeding, no petechial   Neuro: no sensation changes, no focal weakness   Endo: no polydipsia   Psych: denied depression  , she speaks very fast,    Past Medical History:   Diagnosis Date    Asthma     Cancer ([address]     thyroid papillary    Depression     HTN (hypertension)     Inflammatory arthritis       Past Surgical History:   Procedure Laterality Date    HX OTHER SURGICAL  2016 carotid artery dissection    HX THYROIDECTOMY  1999    total    HX TONSIL AND ADENOIDECTOMY  1984     Prior to Admission medications    Medication Sig Start Date End Date Taking? Authorizing Provider   cholecalciferol (VITAMIN D3) (5000 Units/125 mcg) tab tablet Take 5,000 Units by mouth five (5) days a week. Yes Provider, Historical   lamoTRIgine (LaMICtal) 25 mg tablet Take 25 mg by mouth daily. (25 mg in the morning; 200 mg in the evening)   Yes Provider, Historical   levothyroxine (SYNTHROID) 175 mcg tablet Take 175 mcg by mouth five (5) days a week. (175 mcg x 5 days; 350 mcg on Sunday; 262.5 mcg on Wednesday)   Yes Provider, Historical   levothyroxine (SYNTHROID) 175 mcg tablet Take 350 mcg by mouth every Sunday. (175 mcg x 5 days; 350 mcg on Sunday; 262.5 mcg on Wednesday)   Yes Provider, Historical   levothyroxine (SYNTHROID) 175 mcg tablet Take 262.5 mcg by mouth every Wednesday. (175 mcg x 5 days; 350 mcg on Sunday; 262.5 mcg on Wednesday)   Yes Provider, Historical   acetaminophen (TYLENOL) 500 mg tablet Take 1,000 mg by mouth four (4) times daily as needed for Pain (headache). Yes Provider, Historical   therapeutic multivitamin (Thera) tablet Take 1 Tablet by mouth daily. Yes Provider, Historical   atenoloL (TENORMIN) 25 mg tablet Take 25 mg by mouth every twelve (12) hours. Yes Provider, Historical   baclofen (LIORESAL) 10 mg tablet Take 10 mg by mouth every twelve (12) hours. Yes Provider, Historical   leflunomide (ARAVA) 20 mg tablet Take 20 mg by mouth daily. Yes Provider, Historical   montelukast (SINGULAIR) 10 mg tablet Take 10 mg by mouth nightly. Yes Provider, Historical   pravastatin (PRAVACHOL) 10 mg tablet Take 10 mg by mouth nightly. Yes Provider, Historical   predniSONE (DELTASONE) 5 mg tablet Take 10 mg by mouth daily. Yes Provider, Historical   promethazine (PHENERGAN) 12.5 mg tablet Take 12.5 mg by mouth every six (6) hours as needed for Nausea.    Yes Provider, Historical   semaglutide (Ozempic) 1 mg/dose (2 mg/1.5 mL) sub-q pen 0.25 mg by SubCUTAneous route every other day. Yes Provider, Historical   sodium,calcium,mag,pot oxybate Betsy Cotton) 0.5 gram/mL soln Take 2,750 mg by mouth nightly. (dose = 2750 mg = 5.5 mL)   Yes Provider, Historical   CAFFEINE PO Take 1,000 mg by mouth four (4) times daily as needed. ([employer])   Yes Provider, Historical   levalbuterol tartrate (XOPENEX) 45 mcg/actuation inhaler Take 2 Puffs by inhalation every four (4) hours as needed for Wheezing or Shortness of Breath. Yes Provider, Historical   Lisdexamfetamine (VYVANSE) 70 mg cap Take 70 mg by mouth daily. Yes Provider, Historical   lamoTRIgine (LaMICtal) 100 mg tablet Take 200 mg by mouth nightly. (25 mg in the morning; 200 mg in the evening)   Yes Provider, Historical   sertraline (ZOLOFT) 100 mg tablet Take 200 mg by mouth nightly. Yes Provider, Historical   buPROPion XL (WELLBUTRIN XL) 300 mg XL tablet Take 300 mg by mouth nightly. Yes Provider, Historical   aspirin delayed-release 81 mg tablet Take 81 mg by mouth daily. Indications: carotid artery disection with stent 3/26/16  Yes Provider, Historical   adalimumab (HUMRIA) 40 mg/0.8 mL injection 40 mg by SubCUTAneous route every Tuesday. Yes Provider, Historical   fexofenadine (ALLEGRA) 180 mg tablet Take 180 mg by mouth every twelve (12) hours. Yes Provider, Historical     Allergies   Allergen Reactions    Albuterol Palpitations    Bactrim [Sulfamethoprim Ds] Other (comments)     Elevated liver enzymes    Lisinopril Cough and Angioedema    Olmesartan Angioedema    Spironolactone Angioedema      Family History   Problem Relation Age of Onset    Stroke Father     Diabetes Father     Hypertension Father     Hypertension Sister     Breast Cancer Sister     Heart Attack Sister       Social History:  reports that she has never smoked. She has never used smokeless tobacco. She reports that she does not drink alcohol.      Family and social history were personally reviewed, all pertinent and relevant details are outlined as above. Objective:   Visit Vitals  BP (!) 149/124   Pulse 81   Temp 98.3 °F (36.8 °C)   Resp 14   Ht 5' 7\" (1.702 m)   Wt 85.7 kg (189 lb)   SpO2 97%   BMI 29.60 kg/m²      O2 Device: None (Room air)    PHYSICAL EXAM:   General: Alert x oriented x 3, awake, anxious   HEENT: PEERL, EOMI, moist mucus membranes  Neck: Supple, no JVD, no meningeal signs  Chest: Clear to auscultation bilaterally   CVS: RRR, S1 S2 heard, no murmurs/rubs/gallops  Abd: Soft, non-tender, non-distended, +bowel sounds   Ext: No clubbing, no cyanosis, no edema  Neuro/Psych: Pleasant mood and affect, CN 2-12 grossly intact, sensory grossly within normal limit, Strength 5/5 in all extremities, DTR 1+ x 4  Cap refill: Brisk, less than 3 seconds  Pulses: 2+, symmetric in all extremities  Skin: Warm, dry, without rashes or lesions    Data Review: All diagnostic labs and studies have been reviewed. Abnormal Labs Reviewed   CBC WITH AUTOMATED DIFF - Abnormal; Notable for the following components:       Result Value    WBC 11.7 (*)     MCH 34.1 (*)     NEUTROPHILS 78 (*)     LYMPHOCYTES 11 (*)     IMMATURE GRANULOCYTES 1 (*)     ABS. NEUTROPHILS 9.3 (*)     ABS. IMM. GRANS. 0.1 (*)     All other components within normal limits   METABOLIC PANEL, COMPREHENSIVE - Abnormal; Notable for the following components:    Anion gap 1 (*)     All other components within normal limits       All Micro Results       Procedure Component Value Units Date/Time    URINE CULTURE HOLD SAMPLE [[id number]] Collected: 08/28/22 1820    Order Status: Completed Specimen: Serum Updated: 08/28/22 1837     Urine culture hold       Urine on hold in Microbiology dept for 2 days. If unpreserved urine is submitted, it cannot be used for addtional testing after 24 hours, recollection will be required.                   IMAGING:   MRI BRAIN WO CONT    (Results Pending)   MRA BRAIN WO CONT    (Results Pending)   MRA NECK WO CONT    (Results Pending)        ECG/ECHO:    Results for orders placed or performed in visit on 10/27/10   EKG, 12 LEAD, INITIAL    Narrative    NSR, WNL        Assessment:   Given the patient's current clinical presentation, there is a high level of concern for decompensation if discharged from the emergency department. Complex decision making was performed, which includes reviewing the patient's available past medical records, laboratory results, and imaging studies. Active Problems:    Confusion (8/28/2022)    Plan:     Confusion: Etiology unclear, will check EEG, brain MRI, brain MRA and neck MRA. If notice any stroke, then may need further stroke work-up including echo. History of depression: With her her current presentation, sending about her her anxiety component, or bipolar component, consider psych evaluation if for above work-up negative. Resume home meds, hold her home Wellbutrin    Hypothyroidism, resume home meds  Hypertension: Resume home meds          DIET: ADULT DIET Regular   ISOLATION PRECAUTIONS: There are currently no Active Isolations  CODE STATUS: Full Code   DVT PROPHYLAXIS: Lovenox  FUNCTIONAL STATUS PRIOR TO HOSPITALIZATION: Fully active and ambulatory; able to carry on all self-care without restriction. EARLY MOBILITY ASSESSMENT: Recommend routine ambulation while hospitalized with the assistance of nursing staff  ANTICIPATED DISCHARGE: 24-48 hours. EMERGENCY CONTACT/SURROGATE DECISION MAKER: pt makes her own decidiosn     CRITICAL CARE WAS PERFORMED FOR THIS ENCOUNTER: NO.      Signed By: Santana Smith MD     August 28, 2022         Please note that this dictation may have been completed with Isadora Maria, the computer voice recognition software. Quite often unanticipated grammatical, syntax, homophones, and other interpretive errors are inadvertently transcribed by the computer software. Please disregard these errors.   Please excuse any errors that have escaped final proofreading. 3 = A little assistance